# Patient Record
Sex: MALE | NOT HISPANIC OR LATINO | Employment: FULL TIME | URBAN - NONMETROPOLITAN AREA
[De-identification: names, ages, dates, MRNs, and addresses within clinical notes are randomized per-mention and may not be internally consistent; named-entity substitution may affect disease eponyms.]

---

## 2022-05-06 ENCOUNTER — PREP FOR SURGERY (OUTPATIENT)
Dept: OTHER | Facility: HOSPITAL | Age: 52
End: 2022-05-06

## 2022-05-06 DIAGNOSIS — Z11.52 ENCOUNTER FOR SCREENING FOR COVID-19: Primary | ICD-10-CM

## 2022-05-06 RX ORDER — DEXTROSE AND SODIUM CHLORIDE 5; .45 G/100ML; G/100ML
30 INJECTION, SOLUTION INTRAVENOUS CONTINUOUS PRN
Status: CANCELLED | OUTPATIENT
Start: 2022-06-20

## 2022-06-20 ENCOUNTER — ANESTHESIA EVENT (OUTPATIENT)
Dept: GASTROENTEROLOGY | Facility: HOSPITAL | Age: 52
End: 2022-06-20

## 2022-06-20 ENCOUNTER — HOSPITAL ENCOUNTER (OUTPATIENT)
Facility: HOSPITAL | Age: 52
Setting detail: HOSPITAL OUTPATIENT SURGERY
Discharge: HOME | End: 2022-06-20
Attending: INTERNAL MEDICINE | Admitting: INTERNAL MEDICINE

## 2022-06-20 ENCOUNTER — ANESTHESIA (OUTPATIENT)
Dept: GASTROENTEROLOGY | Facility: HOSPITAL | Age: 52
End: 2022-06-20

## 2022-06-20 VITALS
BODY MASS INDEX: 30.88 KG/M2 | HEART RATE: 74 BPM | DIASTOLIC BLOOD PRESSURE: 74 MMHG | WEIGHT: 228 LBS | TEMPERATURE: 96.2 F | HEIGHT: 72 IN | SYSTOLIC BLOOD PRESSURE: 124 MMHG | OXYGEN SATURATION: 97 % | RESPIRATION RATE: 16 BRPM

## 2022-06-20 DIAGNOSIS — Z11.52 ENCOUNTER FOR SCREENING FOR COVID-19: ICD-10-CM

## 2022-06-20 PROCEDURE — 25010000002 PROPOFOL 10 MG/ML EMULSION

## 2022-06-20 RX ORDER — PROPOFOL 10 MG/ML
VIAL (ML) INTRAVENOUS AS NEEDED
Status: DISCONTINUED | OUTPATIENT
Start: 2022-06-20 | End: 2022-06-20 | Stop reason: SURG

## 2022-06-20 RX ORDER — LIDOCAINE HYDROCHLORIDE 20 MG/ML
INJECTION, SOLUTION INTRAVENOUS AS NEEDED
Status: DISCONTINUED | OUTPATIENT
Start: 2022-06-20 | End: 2022-06-20 | Stop reason: SURG

## 2022-06-20 RX ORDER — DEXTROSE AND SODIUM CHLORIDE 5; .45 G/100ML; G/100ML
30 INJECTION, SOLUTION INTRAVENOUS CONTINUOUS PRN
Status: DISCONTINUED | OUTPATIENT
Start: 2022-06-20 | End: 2022-06-20 | Stop reason: HOSPADM

## 2022-06-20 RX ORDER — OMEPRAZOLE 20 MG/1
20 CAPSULE, DELAYED RELEASE ORAL DAILY
COMMUNITY

## 2022-06-20 RX ORDER — MULTIVIT WITH MINERALS/LUTEIN
250 TABLET ORAL DAILY
COMMUNITY

## 2022-06-20 RX ORDER — CETIRIZINE HYDROCHLORIDE 10 MG/1
10 TABLET ORAL DAILY
COMMUNITY

## 2022-06-20 RX ADMIN — PROPOFOL 50 MG: 10 INJECTION, EMULSION INTRAVENOUS at 15:24

## 2022-06-20 RX ADMIN — PROPOFOL 100 MG: 10 INJECTION, EMULSION INTRAVENOUS at 15:19

## 2022-06-20 RX ADMIN — DEXTROSE AND SODIUM CHLORIDE 30 ML/HR: 5; 450 INJECTION, SOLUTION INTRAVENOUS at 14:12

## 2022-06-20 RX ADMIN — PROPOFOL 50 MG: 10 INJECTION, EMULSION INTRAVENOUS at 15:21

## 2022-06-20 RX ADMIN — LIDOCAINE HYDROCHLORIDE 40 MG: 20 INJECTION, SOLUTION INTRAVENOUS at 15:19

## 2022-06-20 RX ADMIN — PROPOFOL 50 MG: 10 INJECTION, EMULSION INTRAVENOUS at 15:28

## 2022-06-20 NOTE — ANESTHESIA POSTPROCEDURE EVALUATION
Patient: Jung Selby    Procedure Summary     Date: 06/20/22 Room / Location: [facility] ENDOSCOPY 2 / [facility] ENDOSCOPY    Anesthesia Start: 1518 Anesthesia Stop: 1534    Procedure: COLONOSCOPY 3:30 (N/A ) Diagnosis:       Encounter for screening for COVID-19      (Encounter for screening for COVID-19 [Z11.52])    Surgeons: Nestor Echols DO Provider: Marta William CRNA    Anesthesia Type: MAC ASA Status: 2          Anesthesia Type: MAC    Vitals  No vitals data found for the desired time range.          Post Anesthesia Care and Evaluation    Patient location during evaluation: bedside  Patient participation: complete - patient participated  Level of consciousness: awake and alert  Pain management: adequate    Airway patency: patent  Anesthetic complications: No anesthetic complications  PONV Status: none  Cardiovascular status: acceptable  Respiratory status: acceptable  Hydration status: acceptable    Comments: -------------------------              06/20/22                    1405        -------------------------   BP:         150/90        Pulse:        84          Resp:         18          Temp:   98.6 °F (37 °C)   SpO2:         98%        -------------------------

## 2022-06-20 NOTE — ANESTHESIA PREPROCEDURE EVALUATION
Anesthesia Evaluation     Patient summary reviewed and Nursing notes reviewed   NPO Solid Status: > 8 hours  NPO Liquid Status: > 4 hours           Airway   Mallampati: II  TM distance: >3 FB  Neck ROM: full  No difficulty expected  Dental      Pulmonary - negative pulmonary ROS and normal exam    breath sounds clear to auscultation  Cardiovascular - negative cardio ROS and normal exam    Rhythm: regular  Rate: normal        Neuro/Psych- negative ROS  GI/Hepatic/Renal/Endo - negative ROS     Musculoskeletal (-) negative ROS    Abdominal  - normal exam   Substance History      OB/GYN negative ob/gyn ROS         Other                        Anesthesia Plan    ASA 2     MAC     intravenous induction     Anesthetic plan, risks, benefits, and alternatives have been provided, discussed and informed consent has been obtained with: patient.        CODE STATUS:

## 2022-06-20 NOTE — H&P
Francoise Jackman DO,[facility]  Gastroenterology  Hepatology  Endoscopy  Board Certified in Internal Medicine and gastroenterology  [address], [address]  [place]. [address]  - [phone]   F - [phone]     GASTROENTEROLOGY HISTORY AND PHYSICAL  NOTE   FRANCOISE JACKMAN DO.         SUBJECTIVE:   6/20/2022    Name: Jung Selby  DOD: 1970        Chief Complaint:     Subjective : Screening for colon cancer    Patient is 51 y.o. male presents with desire for elective colonoscopy     ROS/HISTORY/ CURRENT MEDICATIONS/OBJECTIVE/VS/PE:   Review of Systems:  All systems unremarkable unless specified below.  Constitutional   HENT  Eyes   Respiratory    Cardiovascular  Gastrointestinal   Endocrine  Genitourinary    Musculoskeletal   Skin  Allergic/Immunologic    Neurological    Hematological  Psychiatric/Behavioral    History:   History reviewed. No pertinent past medical history.  History reviewed. No pertinent surgical history.  History reviewed. No pertinent family history.     Prior to Admission medications    Medication Sig Start Date End Date Taking? Authorizing Provider   cetirizine (zyrTEC) 10 MG tablet Take 10 mg by mouth Daily.   Yes ProviderPawel MD   omeprazole (priLOSEC) 20 MG capsule Take 20 mg by mouth Daily.   Yes ProviderPawel MD   vitamin C (ASCORBIC ACID) 250 MG tablet Take 250 mg by mouth Daily.   Yes Provider, MD Pawel     Allergies:  Patient has no known allergies.    I have reviewed the patients medical history, surgical history and family history in the available medical record system.     Current Medications:     Current Facility-Administered Medications   Medication Dose Route Frequency Provider Last Rate Last Admin   • dextrose 5 % and sodium chloride 0.45 % infusion  30 mL/hr Intravenous Continuous PRN Francoise Jackman DO 30 mL/hr at 06/20/22 1412 30 mL/hr at 06/20/22 1412       Objective     Physical Exam:   Temp:  [98.6 °F (37 °C)] 98.6 °F  (37 °C)  Heart Rate:  [84] 84  Resp:  [18] 18  BP: (150)/(90) 150/90    Physical Exam:  General Appearance:    Alert, cooperative, in no acute distress   Head:    Normocephalic, without obvious abnormality, atraumatic   Eyes:            Lids and lashes normal, conjunctivae and sclerae normal, no icterus, no pallor, corneas clear, PERRLA   Ears:    Ears appear intact with no abnormalities noted   Throat:   No oral lesions, no thrush, oral mucosa moist   Neck:   No adenopathy, supple, trachea midline, no thyromegaly, no  carotid bruit, no JVD   Back:     No kyphosis present, no scoliosis present, no skin lesions,   erythema or scars, no tenderness to percussion or                 palpation,  range of motion normal   Lungs:     Clear to auscultation,respirations regular, even and         unlabored    Heart:    Regular rhythm and normal rate, normal S1 and S2, no  murmur, no gallop, no rub, no click   Breast Exam:    Deferred   Abdomen:     Normal bowel sounds, no masses, no organomegaly, soft  nontender, nondistended, no guarding, no rebound                 tenderness   Genitalia:    Deferred   Extremities:   Moves all extremities well, no edema, no cyanosis, no          redness   Pulses:   Pulses palpable and equal bilaterally   Skin:   No bleeding, bruising or rash   Lymph nodes:   No palpable adenopathy   Neurologic:   Cranial nerves 2 - 12 grossly intact, sensation intact, DTR     present and equal bilaterally      Results Review:     Lab Results   Component Value Date    WBC 7.7 01/30/2019    HGB 13.9 (L) 01/30/2019    HCT 42.1 (L) 01/30/2019     01/30/2019             No results found for: LIPASE  No results found for: INR  No results found for: CULTURE    Radiology Review:  Imaging Results (Last 72 Hours)     ** No results found for the last 72 hours. **           I reviewed the patient's new clinical results.  I reviewed the patient's new imaging results and agree with the interpretation.      ASSESSMENT/PLAN:   ASSESSMENT:  1.  Screen for colon cancer    PLAN:  1.  Colonoscopy    Risk and benefits associated with the procedure are reviewed with the patient.  The patient wished to proceed     Nestor Echols DO  06/20/22  15:16 CDT

## 2022-07-27 ENCOUNTER — OFFICE VISIT (OUTPATIENT)
Dept: OTOLARYNGOLOGY | Facility: CLINIC | Age: 52
End: 2022-07-27

## 2022-07-27 ENCOUNTER — CLINICAL SUPPORT (OUTPATIENT)
Dept: AUDIOLOGY | Facility: CLINIC | Age: 52
End: 2022-07-27

## 2022-07-27 VITALS — HEIGHT: 72 IN | WEIGHT: 237.4 LBS | BODY MASS INDEX: 32.15 KG/M2 | TEMPERATURE: 98.1 F

## 2022-07-27 DIAGNOSIS — H90.12 CONDUCTIVE HEARING LOSS OF LEFT EAR WITH UNRESTRICTED HEARING OF RIGHT EAR: Primary | ICD-10-CM

## 2022-07-27 DIAGNOSIS — H69.82 DYSFUNCTION OF LEFT EUSTACHIAN TUBE: ICD-10-CM

## 2022-07-27 PROCEDURE — 92557 COMPREHENSIVE HEARING TEST: CPT | Performed by: AUDIOLOGIST

## 2022-07-27 PROCEDURE — 92567 TYMPANOMETRY: CPT | Performed by: AUDIOLOGIST

## 2022-07-27 PROCEDURE — 99203 OFFICE O/P NEW LOW 30 MIN: CPT | Performed by: OTOLARYNGOLOGY

## 2022-07-27 NOTE — PROGRESS NOTES
STANDARD AUDIOMETRIC EVALUATION      Name:  Jung Selby  :  1970  Age:  51 y.o.  Date of Evaluation:  2022      HISTORY    Reason for visit:  Jung Selby is seen today for a hearing test at the request of Dr. Junior Loomis.  Patient reports a history of surgery on his left ear in  and  which he describes as a tympanoplasty.  He states he has scar tissue in his left ear, and he can't hear in his left ear.  He states he does not wear a hearing aid, and he has not had any recent ear infections.      EVALUATION    See Audiogram    RESULTS        Otoscopy and Tympanometry 226 Hz :  Right Ear:  Otoscopy:  Testing completed after ears were examined by the ENT physician          Tympanometry:  Middle ear function within normal limits    Left Ear:   Otoscopy:  Testing completed after ears were examined by the ENT physician        Tympanometry:  Reduced pressure and compliance with large ear canal volume    Test technique:  Standard Audiometry     Pure Tone Audiometry:   Patient responded to pure tones at 5-5 dB for 250-8000 Hz in right ear, and at 10-40 dB for 250-8000 Hz in left ear.       Speech Audiometry:        Right Ear:  Speech Reception Threshold (SRT) was obtained at 0 dBHL                 Speech Discrimination scores were 100% in quiet when words were presented at 40 dBHL       Left Ear:  Speech Reception Threshold (SRT) was obtained at 25 dBHL                 Speech Discrimination scores were 100% in masking noise when words were presented at  65 dBHL    Reliability:   good    IMPRESSIONS:  1.  Tympanometry results are consistent with Middle ear function within normal limits in right ear, and Reduced pressure and compliance with large ear canal volume in left ear.  2.  Pure tone results are consistent with hearing sensitivity within normal limits for right ear, and within normal limits to mild rising, then sloping conductive hearing loss in left ear.        RECOMMENDATIONS:  Patient is seeing the Ear Nose and Throat physician immediately following this examination.  It was a pleasure seeing Jung Selby in Audiology today.  We would be happy to do further testing or discuss these test as necessary.          This document has been electronically signed by Beth Lipscomb MS CCC-ANGELITA on July 27, 2022 16:53 CDT       Beth Lipscomb MS CCC-ANGELITA  Licensed Audiologist

## 2022-07-27 NOTE — PROGRESS NOTES
Subjective   Jung Selby is a 51 y.o. male.       History of Present Illness   Patient is here to establish care.  States he underwent tympanoplasty on the left ear x2 most recently in 1990.  Sounds like he may have had an ossiculoplasty with the second procedure.  This was done elsewhere.  Says that he used to see an otolaryngologist regularly to have his ear cleaned.  Hearing is decreased on the left.  He is not having any otorrhea.      The following portions of the patient's history were reviewed and updated as appropriate: allergies, current medications, past family history, past medical history, past social history, past surgical history and problem list.     reports that he has never smoked. He has never used smokeless tobacco.   Patient is not a tobacco user and has not been counseled for use of tobacco products      Review of Systems        Objective   Physical Exam  Ears: External ears no deformity.  Right ear canal shows no discharge.  Tympanic membrane intact and clear.  Left ear shows per surgical appearance.  He appears to have had a canalplasty but not a canal wall down mastoid cavity and does not have any significant squamous debris.  Tympanic membrane is intact with dense tympanosclerosis.  Audiogram is obtained and reviewed.  Hearing is entirely within normal limits on the right with a type a tympanogram and 100% discrimination.  Has a mild conductive hearing loss on the left with 100% discrimination and a flat tympanogram.       Assessment and Plan   Diagnoses and all orders for this visit:    1. Conductive hearing loss of left ear with unrestricted hearing of right ear (Primary)             Plan: Reassured the patient that his ears did not need cleaning and that his hearing loss was mild.  Would just recommend observation and reevaluation in a year to see if he develops any debris accumulation.  We will only retest his hearing if he notes a subjective change.  Call for problems in the  meantime.

## 2022-11-29 ENCOUNTER — TRANSCRIBE ORDERS (OUTPATIENT)
Dept: PODIATRY | Facility: CLINIC | Age: 52
End: 2022-11-29

## 2022-11-29 DIAGNOSIS — S91.209A AVULSION OF TOENAIL OF RIGHT FOOT: Primary | ICD-10-CM

## 2023-01-06 ENCOUNTER — HOSPITAL ENCOUNTER (EMERGENCY)
Facility: HOSPITAL | Age: 53
Discharge: HOME | End: 2023-01-06
Attending: STUDENT IN AN ORGANIZED HEALTH CARE EDUCATION/TRAINING PROGRAM | Admitting: STUDENT IN AN ORGANIZED HEALTH CARE EDUCATION/TRAINING PROGRAM
Payer: COMMERCIAL

## 2023-01-06 ENCOUNTER — APPOINTMENT (OUTPATIENT)
Dept: GENERAL RADIOLOGY | Facility: HOSPITAL | Age: 53
End: 2023-01-06
Payer: COMMERCIAL

## 2023-01-06 VITALS
WEIGHT: 230.4 LBS | OXYGEN SATURATION: 95 % | BODY MASS INDEX: 31.21 KG/M2 | DIASTOLIC BLOOD PRESSURE: 85 MMHG | SYSTOLIC BLOOD PRESSURE: 131 MMHG | TEMPERATURE: 98.5 F | RESPIRATION RATE: 18 BRPM | HEIGHT: 72 IN | HEART RATE: 71 BPM

## 2023-01-06 DIAGNOSIS — R07.9 CHEST PAIN, UNSPECIFIED TYPE: Primary | ICD-10-CM

## 2023-01-06 LAB
ALBUMIN SERPL-MCNC: 4.9 G/DL (ref 3.5–5.2)
ALBUMIN/GLOB SERPL: 1.8 G/DL
ALP SERPL-CCNC: 58 U/L (ref 39–117)
ALT SERPL W P-5'-P-CCNC: 31 U/L (ref 1–41)
ANION GAP SERPL CALCULATED.3IONS-SCNC: 12 MMOL/L (ref 5–15)
AST SERPL-CCNC: 18 U/L (ref 1–40)
BASOPHILS # BLD AUTO: 0.03 10*3/MM3 (ref 0–0.2)
BASOPHILS NFR BLD AUTO: 0.4 % (ref 0–1.5)
BILIRUB SERPL-MCNC: 0.6 MG/DL (ref 0–1.2)
BUN SERPL-MCNC: 13 MG/DL (ref 6–20)
BUN/CREAT SERPL: 13.4 (ref 7–25)
CALCIUM SPEC-SCNC: 9.6 MG/DL (ref 8.6–10.5)
CHLORIDE SERPL-SCNC: 100 MMOL/L (ref 98–107)
CO2 SERPL-SCNC: 27 MMOL/L (ref 22–29)
CREAT SERPL-MCNC: 0.97 MG/DL (ref 0.76–1.27)
D-DIMER, QUANTITATIVE (MAD,POW, STR): 340 NG/ML (FEU) (ref 0–510)
DEPRECATED RDW RBC AUTO: 37.2 FL (ref 37–54)
EGFRCR SERPLBLD CKD-EPI 2021: 94.5 ML/MIN/1.73
EOSINOPHIL # BLD AUTO: 0.03 10*3/MM3 (ref 0–0.4)
EOSINOPHIL NFR BLD AUTO: 0.4 % (ref 0.3–6.2)
ERYTHROCYTE [DISTWIDTH] IN BLOOD BY AUTOMATED COUNT: 11.9 % (ref 12.3–15.4)
GLOBULIN UR ELPH-MCNC: 2.8 GM/DL
GLUCOSE SERPL-MCNC: 117 MG/DL (ref 65–99)
HCT VFR BLD AUTO: 43.5 % (ref 37.5–51)
HGB BLD-MCNC: 14.4 G/DL (ref 13–17.7)
HOLD SPECIMEN: NORMAL
HOLD SPECIMEN: NORMAL
IMM GRANULOCYTES # BLD AUTO: 0.02 10*3/MM3 (ref 0–0.05)
IMM GRANULOCYTES NFR BLD AUTO: 0.3 % (ref 0–0.5)
LYMPHOCYTES # BLD AUTO: 1.33 10*3/MM3 (ref 0.7–3.1)
LYMPHOCYTES NFR BLD AUTO: 17.9 % (ref 19.6–45.3)
MCH RBC QN AUTO: 28.5 PG (ref 26.6–33)
MCHC RBC AUTO-ENTMCNC: 33.1 G/DL (ref 31.5–35.7)
MCV RBC AUTO: 86 FL (ref 79–97)
MONOCYTES # BLD AUTO: 0.47 10*3/MM3 (ref 0.1–0.9)
MONOCYTES NFR BLD AUTO: 6.3 % (ref 5–12)
NEUTROPHILS NFR BLD AUTO: 5.57 10*3/MM3 (ref 1.7–7)
NEUTROPHILS NFR BLD AUTO: 74.7 % (ref 42.7–76)
NRBC BLD AUTO-RTO: 0 /100 WBC (ref 0–0.2)
NT-PROBNP SERPL-MCNC: <36 PG/ML (ref 0–900)
PLATELET # BLD AUTO: 188 10*3/MM3 (ref 140–450)
PMV BLD AUTO: 9.7 FL (ref 6–12)
POTASSIUM SERPL-SCNC: 3.9 MMOL/L (ref 3.5–5.2)
PROT SERPL-MCNC: 7.7 G/DL (ref 6–8.5)
RBC # BLD AUTO: 5.06 10*6/MM3 (ref 4.14–5.8)
SODIUM SERPL-SCNC: 139 MMOL/L (ref 136–145)
TROPONIN T SERPL-MCNC: <0.01 NG/ML (ref 0–0.03)
TROPONIN T SERPL-MCNC: <0.01 NG/ML (ref 0–0.03)
WBC NRBC COR # BLD: 7.45 10*3/MM3 (ref 3.4–10.8)
WHOLE BLOOD HOLD COAG: NORMAL
WHOLE BLOOD HOLD SPECIMEN: NORMAL

## 2023-01-06 PROCEDURE — 93005 ELECTROCARDIOGRAM TRACING: CPT | Performed by: STUDENT IN AN ORGANIZED HEALTH CARE EDUCATION/TRAINING PROGRAM

## 2023-01-06 PROCEDURE — 83880 ASSAY OF NATRIURETIC PEPTIDE: CPT | Performed by: STUDENT IN AN ORGANIZED HEALTH CARE EDUCATION/TRAINING PROGRAM

## 2023-01-06 PROCEDURE — 93005 ELECTROCARDIOGRAM TRACING: CPT

## 2023-01-06 PROCEDURE — 85379 FIBRIN DEGRADATION QUANT: CPT | Performed by: STUDENT IN AN ORGANIZED HEALTH CARE EDUCATION/TRAINING PROGRAM

## 2023-01-06 PROCEDURE — 84484 ASSAY OF TROPONIN QUANT: CPT | Performed by: STUDENT IN AN ORGANIZED HEALTH CARE EDUCATION/TRAINING PROGRAM

## 2023-01-06 PROCEDURE — 80053 COMPREHEN METABOLIC PANEL: CPT | Performed by: STUDENT IN AN ORGANIZED HEALTH CARE EDUCATION/TRAINING PROGRAM

## 2023-01-06 PROCEDURE — 96375 TX/PRO/DX INJ NEW DRUG ADDON: CPT

## 2023-01-06 PROCEDURE — 25010000002 MORPHINE PER 10 MG: Performed by: STUDENT IN AN ORGANIZED HEALTH CARE EDUCATION/TRAINING PROGRAM

## 2023-01-06 PROCEDURE — 96374 THER/PROPH/DIAG INJ IV PUSH: CPT

## 2023-01-06 PROCEDURE — 25010000002 ONDANSETRON PER 1 MG: Performed by: STUDENT IN AN ORGANIZED HEALTH CARE EDUCATION/TRAINING PROGRAM

## 2023-01-06 PROCEDURE — 71045 X-RAY EXAM CHEST 1 VIEW: CPT

## 2023-01-06 PROCEDURE — 93010 ELECTROCARDIOGRAM REPORT: CPT | Performed by: INTERNAL MEDICINE

## 2023-01-06 PROCEDURE — 36415 COLL VENOUS BLD VENIPUNCTURE: CPT

## 2023-01-06 PROCEDURE — 99284 EMERGENCY DEPT VISIT MOD MDM: CPT

## 2023-01-06 PROCEDURE — 85025 COMPLETE CBC W/AUTO DIFF WBC: CPT | Performed by: STUDENT IN AN ORGANIZED HEALTH CARE EDUCATION/TRAINING PROGRAM

## 2023-01-06 RX ORDER — SODIUM CHLORIDE 0.9 % (FLUSH) 0.9 %
10 SYRINGE (ML) INJECTION AS NEEDED
Status: DISCONTINUED | OUTPATIENT
Start: 2023-01-06 | End: 2023-01-06 | Stop reason: HOSPADM

## 2023-01-06 RX ORDER — ONDANSETRON 2 MG/ML
4 INJECTION INTRAMUSCULAR; INTRAVENOUS ONCE
Status: COMPLETED | OUTPATIENT
Start: 2023-01-06 | End: 2023-01-06

## 2023-01-06 RX ORDER — NITROGLYCERIN 0.4 MG/1
0.4 TABLET SUBLINGUAL
Status: DISCONTINUED | OUTPATIENT
Start: 2023-01-06 | End: 2023-01-06 | Stop reason: HOSPADM

## 2023-01-06 RX ORDER — NABUMETONE 750 MG/1
750 TABLET, FILM COATED ORAL 2 TIMES DAILY PRN
Qty: 6 TABLET | Refills: 0 | Status: SHIPPED | OUTPATIENT
Start: 2023-01-06 | End: 2023-01-09 | Stop reason: SDUPTHER

## 2023-01-06 RX ORDER — ASPIRIN 81 MG/1
324 TABLET, CHEWABLE ORAL ONCE
Status: COMPLETED | OUTPATIENT
Start: 2023-01-06 | End: 2023-01-06

## 2023-01-06 RX ADMIN — ONDANSETRON 4 MG: 2 INJECTION INTRAMUSCULAR; INTRAVENOUS at 12:26

## 2023-01-06 RX ADMIN — MORPHINE SULFATE 4 MG: 4 INJECTION, SOLUTION INTRAMUSCULAR; INTRAVENOUS at 12:26

## 2023-01-06 RX ADMIN — ASPIRIN 324 MG: 81 TABLET, CHEWABLE ORAL at 10:40

## 2023-01-06 NOTE — ED PROVIDER NOTES
Subjective   History of Present Illness  52-year-old male with a history of GERD comes to the ER with chief complaint of squeezing in his left chest with numbness and tingling down his left arm.  He woke up with the symptoms.  He has had similar episodes in the past, but today its worse than normal.  He reports having a stress test done about 2 years ago and was told that was normal as well.  Nothing seems to make his symptoms better or worse.    History provided by:  Patient and spouse   used: No        Review of Systems   Constitutional: Negative for chills and fever.   HENT: Negative for drooling.    Eyes: Negative for redness.   Respiratory: Negative for cough, chest tightness, shortness of breath and wheezing.    Cardiovascular: Positive for chest pain. Negative for palpitations.   Gastrointestinal: Negative for abdominal pain, constipation, diarrhea, nausea and vomiting.   Genitourinary: Negative for flank pain.   Skin: Negative for color change.   Neurological: Negative for dizziness, seizures, facial asymmetry, speech difficulty, weakness, light-headedness, numbness and headaches.   Psychiatric/Behavioral: Negative for confusion.       History reviewed. No pertinent past medical history.    Allergies   Allergen Reactions   • Penicillins Rash       Past Surgical History:   Procedure Laterality Date   • COLONOSCOPY N/A 06/20/2022    Procedure: COLONOSCOPY 3:30;  Surgeon: Nestor Echols DO;  Location: [facility] ENDOSCOPY;  Service: Gastroenterology;  Laterality: N/A;   • CORNEAL TRANSPLANT Right    • TYMPANOPLASTY Left        History reviewed. No pertinent family history.    Social History     Socioeconomic History   • Marital status:    Tobacco Use   • Smoking status: Never   • Smokeless tobacco: Never           Objective    Vitals:    01/06/23 0920 01/06/23 1039 01/06/23 1151   BP: 149/88 126/82 131/85   BP Location: Right arm     Patient Position: Sitting     Pulse: 84 74 71  "  Resp: 20 19 18   Temp: 98.5 °F (36.9 °C)     TempSrc: Oral     SpO2: 97% 95% 95%   Weight: 105 kg (230 lb 6.4 oz)     Height: 182.9 cm (72\")         Physical Exam  Vitals and nursing note reviewed.   Constitutional:       General: He is not in acute distress.     Appearance: He is well-developed. He is not ill-appearing, toxic-appearing or diaphoretic.   HENT:      Nose: No congestion or rhinorrhea.   Eyes:      General: No scleral icterus.     Conjunctiva/sclera: Conjunctivae normal.   Cardiovascular:      Rate and Rhythm: Normal rate.   Pulmonary:      Effort: Pulmonary effort is normal. No accessory muscle usage or respiratory distress.   Chest:      Chest wall: No tenderness.   Abdominal:      Palpations: Abdomen is soft.      Tenderness: There is no abdominal tenderness (deep palpation).   Musculoskeletal:         General: No tenderness or signs of injury. Normal range of motion.   Skin:     General: Skin is warm and dry.      Capillary Refill: Capillary refill takes less than 2 seconds.   Neurological:      Mental Status: He is alert and oriented to person, place, and time.      GCS: GCS eye subscore is 4. GCS verbal subscore is 5. GCS motor subscore is 6.      Cranial Nerves: No dysarthria or facial asymmetry.      Sensory: No sensory deficit.      Motor: No weakness or abnormal muscle tone.         ECG 12 Lead      Date/Time: 1/6/2023 12:21 PM  Performed by: Devin Merida MD  Authorized by: Devin Merida MD   Interpreted by physician  Rhythm: sinus rhythm  Rate: normal  QRS axis: normal  ST Segments: ST segments normal  T Waves: T waves normal  Clinical impression: normal ECG                 ED Course      Results for orders placed or performed during the hospital encounter of 01/06/23   Troponin    Specimen: Blood   Result Value Ref Range    Troponin T <0.010 0.000 - 0.030 ng/mL   Troponin    Specimen: Blood   Result Value Ref Range    Troponin T <0.010 0.000 - 0.030 ng/mL   Comprehensive " Metabolic Panel    Specimen: Blood   Result Value Ref Range    Glucose 117 (H) 65 - 99 mg/dL    BUN 13 6 - 20 mg/dL    Creatinine 0.97 0.76 - 1.27 mg/dL    Sodium 139 136 - 145 mmol/L    Potassium 3.9 3.5 - 5.2 mmol/L    Chloride 100 98 - 107 mmol/L    CO2 27.0 22.0 - 29.0 mmol/L    Calcium 9.6 8.6 - 10.5 mg/dL    Total Protein 7.7 6.0 - 8.5 g/dL    Albumin 4.9 3.5 - 5.2 g/dL    ALT (SGPT) 31 1 - 41 U/L    AST (SGOT) 18 1 - 40 U/L    Alkaline Phosphatase 58 39 - 117 U/L    Total Bilirubin 0.6 0.0 - 1.2 mg/dL    Globulin 2.8 gm/dL    A/G Ratio 1.8 g/dL    BUN/Creatinine Ratio 13.4 7.0 - 25.0    Anion Gap 12.0 5.0 - 15.0 mmol/L    eGFR 94.5 >60.0 mL/min/1.73   BNP    Specimen: Blood   Result Value Ref Range    proBNP <36.0 0.0 - 900.0 pg/mL   CBC Auto Differential    Specimen: Blood   Result Value Ref Range    WBC 7.45 3.40 - 10.80 10*3/mm3    RBC 5.06 4.14 - 5.80 10*6/mm3    Hemoglobin 14.4 13.0 - 17.7 g/dL    Hematocrit 43.5 37.5 - 51.0 %    MCV 86.0 79.0 - 97.0 fL    MCH 28.5 26.6 - 33.0 pg    MCHC 33.1 31.5 - 35.7 g/dL    RDW 11.9 (L) 12.3 - 15.4 %    RDW-SD 37.2 37.0 - 54.0 fl    MPV 9.7 6.0 - 12.0 fL    Platelets 188 140 - 450 10*3/mm3    Neutrophil % 74.7 42.7 - 76.0 %    Lymphocyte % 17.9 (L) 19.6 - 45.3 %    Monocyte % 6.3 5.0 - 12.0 %    Eosinophil % 0.4 0.3 - 6.2 %    Basophil % 0.4 0.0 - 1.5 %    Immature Grans % 0.3 0.0 - 0.5 %    Neutrophils, Absolute 5.57 1.70 - 7.00 10*3/mm3    Lymphocytes, Absolute 1.33 0.70 - 3.10 10*3/mm3    Monocytes, Absolute 0.47 0.10 - 0.90 10*3/mm3    Eosinophils, Absolute 0.03 0.00 - 0.40 10*3/mm3    Basophils, Absolute 0.03 0.00 - 0.20 10*3/mm3    Immature Grans, Absolute 0.02 0.00 - 0.05 10*3/mm3    nRBC 0.0 0.0 - 0.2 /100 WBC   D-dimer, Quantitative    Specimen: Blood   Result Value Ref Range    D-Dimer, Quantitative 340 0 - 510 ng/mL (FEU)   ECG 12 Lead Chest Pain   Result Value Ref Range    QT Interval 368 ms    QTC Interval 427 ms   Green Top (Gel)   Result Value Ref  Range    Extra Tube Hold for add-ons.    Lavender Top   Result Value Ref Range    Extra Tube hold for add-on    Gold Top - SST   Result Value Ref Range    Extra Tube Hold for add-ons.    Light Blue Top   Result Value Ref Range    Extra Tube Hold for add-ons.      XR Chest 1 View   Final Result   Negative single view chest      Electronically signed by:  Arjun Gonzales MD  1/6/2023 11:06 AM CST   Workstation: [id number]          HEART Score for Major Cardiac Events - MDCalc  3 points -> Low Score (0-3 points) Risk of MACE of 0.9-1.7%.       Medical Decision Making  Vital signs are stable, afebrile.  Labs unremarkable.  Troponin negative x2.  D-dimer negative.  Chest x-ray shows no acute cardiopulmonary processes.  EKG is sinus rhythm no acute ischemic changes.  Recommend patient follow-up with his PCP and cardiology.  He has an appointment to see GI next week.  Return precautions given.  Patient states understanding and is agreeable to the plan.    Chest pain, unspecified type: acute illness or injury  Amount and/or Complexity of Data Reviewed  Labs: ordered. Decision-making details documented in ED Course.  Radiology: ordered. Decision-making details documented in ED Course.  ECG/medicine tests: ordered and independent interpretation performed. Decision-making details documented in ED Course.      Risk  OTC drugs.  Prescription drug management.          Final diagnoses:   Chest pain, unspecified type       ED Disposition  ED Disposition     ED Disposition   Discharge    Condition   Stable    Comment   --             Giselle Richards, APRN  [address] [address]  [phone]    Schedule an appointment as soon as possible for a visit in 2 days  ER follow up    Laura Wolf, APRN  [address]  [address] [place] [address]  [phone]    Schedule an appointment as soon as possible for a visit in 2 days  ER follow up         Medication List      New Prescriptions    nabumetone 750  MG tablet  Commonly known as: RELAFEN  Take 1 tablet by mouth 2 (Two) Times a Day As Needed for Mild Pain.           Where to Get Your Medications      These medications were sent to [facility] - [place], [place] - [address] - [phone]  - [phone] [facility] [address], [place] [address]    Phone: [phone]   · nabumetone 750 MG tablet          Devin Merida MD  01/06/23 7215

## 2023-01-08 LAB
QT INTERVAL: 368 MS
QTC INTERVAL: 427 MS

## 2023-01-09 ENCOUNTER — OFFICE VISIT (OUTPATIENT)
Dept: CARDIOLOGY | Facility: CLINIC | Age: 53
End: 2023-01-09
Payer: COMMERCIAL

## 2023-01-09 VITALS
DIASTOLIC BLOOD PRESSURE: 80 MMHG | SYSTOLIC BLOOD PRESSURE: 124 MMHG | OXYGEN SATURATION: 97 % | WEIGHT: 231 LBS | HEART RATE: 75 BPM | BODY MASS INDEX: 31.29 KG/M2 | HEIGHT: 72 IN

## 2023-01-09 DIAGNOSIS — K21.9 GASTROESOPHAGEAL REFLUX DISEASE, UNSPECIFIED WHETHER ESOPHAGITIS PRESENT: ICD-10-CM

## 2023-01-09 DIAGNOSIS — R07.89 CHEST PAIN, ATYPICAL: Primary | ICD-10-CM

## 2023-01-09 PROCEDURE — 99204 OFFICE O/P NEW MOD 45 MIN: CPT | Performed by: NURSE PRACTITIONER

## 2023-01-09 RX ORDER — NABUMETONE 750 MG/1
750 TABLET, FILM COATED ORAL 2 TIMES DAILY PRN
Qty: 14 TABLET | Refills: 0 | Status: SHIPPED | OUTPATIENT
Start: 2023-01-09

## 2023-01-09 NOTE — PROGRESS NOTES
Chest Pain (Chief Complaint- hospital follow up)      History of Present Illness    Mr. Jung Selby \"Trevon\" is a 82-year-old  male with medical history GERD, allergic rhinitis who presents today for evaluation of chest pain.  She reports symptoms started in June when he thought he was having a heart attack.  He sought treatment and work-up was negative.  They started him on Prilosec and he had another episode in the fall work-up was negative again including troponins, EKG, x-rays.  They increased Prilosec at that time.  He reports having an ultrasound of his gallbladder was normal.  Recently he was seen in the ER on 1-6-2023.  Patient describes left-sided chest pain, described as squeezing \"feels like a cramp\", radiating down left arm with numbness and tingling.  Patient reports he woke up with the symptoms. Symptoms have reoccurred several times. Worse when he wakes up in the morning. Will last 4 hrs, longest 8 hrs. Additional symptoms of diaphoresis. Denies recent injury or illness.     -ER work-up reviewed showing normal EKG, negative cardiac enzymes chest x-ray clear. He was given Nabumetone 750mg, which he reports has helped some. Denies symptoms being worsened after meals, movement or exertion.  He was evaluated by cardiology in 2019 by Dr. Parrish at [facility] in [facility].  Echocardiogram stress test was low risk, no ischemia noted. Holter monitor was normal.      The 10-year ASCVD risk score (Omid ORTEGA, et al., 2019) is: 4.3%    Values used to calculate the score:      Age: 52 years      Sex: Male      Is Non- : No      Diabetic: No      Tobacco smoker: No      Systolic Blood Pressure: 124 mmHg      Is BP treated: No      HDL Cholesterol: 45 mg/dL      Total Cholesterol: 208 mg/dL    Past Medical History:   Diagnosis Date   • GERD (gastroesophageal reflux disease)    • Hx of eye surgery    • Perforated ear drum      Past Surgical History:   Procedure Laterality  Date   • COLONOSCOPY N/A 06/20/2022    Procedure: COLONOSCOPY 3:30;  Surgeon: Nestor Echols DO;  Location: [facility] ENDOSCOPY;  Service: Gastroenterology;  Laterality: N/A;   • CORNEAL TRANSPLANT Right    • TYMPANOPLASTY Left      Social History     Socioeconomic History   • Marital status:    Tobacco Use   • Smoking status: Never   • Smokeless tobacco: Never   Substance and Sexual Activity   • Alcohol use: Not Currently   • Drug use: Never     History reviewed. No pertinent family history.    ALLERGIES:  Allergies   Allergen Reactions   • Penicillins Rash         Review of Systems   Constitutional: Negative for chills, fever, malaise/fatigue and weight gain.   HENT: Negative for nosebleeds and tinnitus.    Eyes: Negative for blurred vision and double vision.   Cardiovascular: Positive for chest pain. Negative for dyspnea on exertion, irregular heartbeat, leg swelling, palpitations and syncope.   Respiratory: Negative for cough, shortness of breath, sleep disturbances due to breathing and snoring.    Endocrine: Negative for polydipsia, polyphagia and polyuria.   Hematologic/Lymphatic: Negative for bleeding problem. Does not bruise/bleed easily.   Skin: Negative for color change and suspicious lesions.   Musculoskeletal: Negative for falls and myalgias.   Gastrointestinal: Positive for heartburn. Negative for bloating and hematochezia.   Genitourinary: Negative for dysuria and hematuria.   Neurological: Negative for dizziness, headaches, seizures, vertigo and weakness.   Psychiatric/Behavioral: Negative for altered mental status and depression. The patient does not have insomnia and is not nervous/anxious.    Allergic/Immunologic: Negative for environmental allergies and persistent infections.       Current Outpatient Medications   Medication Sig Dispense Refill   • cetirizine (zyrTEC) 10 MG tablet Take 10 mg by mouth Daily.     • nabumetone (RELAFEN) 750 MG tablet Take 1 tablet by mouth 2 (Two) Times a Day  As Needed for Mild Pain. 14 tablet 0   • omeprazole (priLOSEC) 20 MG capsule Take 20 mg by mouth Daily.     • vitamin C (ASCORBIC ACID) 250 MG tablet Take 250 mg by mouth Daily.     • metoprolol tartrate (LOPRESSOR) 25 MG tablet Take 0.5 tablets by mouth Take As Directed. Night before and morning of CT scan 1 tablet 0     No current facility-administered medications for this visit.       OBJECTIVE:    Physical Exam:   Vitals reviewed.   Constitutional:       General: Not in acute distress.     Appearance: Normal appearance. Well-developed. Not toxic-appearing or diaphoretic.   Eyes:      General: Lids are normal.      Conjunctiva/sclera: Conjunctivae normal.   HENT:      Head: Normocephalic and atraumatic.      Right Ear: External ear normal.      Left Ear: External ear normal.   Neck:      Vascular: No JVD.   Pulmonary:      Effort: Pulmonary effort is normal. No respiratory distress.      Breath sounds: Normal breath sounds. No decreased breath sounds. No wheezing. No rales.   Chest:      Chest wall: Not tender to palpatation.   Cardiovascular:      PMI at left midclavicular line. Normal rate. Regular rhythm. Normal S1 with normal intensity. Normal S2 with normal intensity.      Murmurs: There is no murmur.      No gallop. No S3 and S4 gallop. No click. No rub.   Pulses:     Intact distal pulses. No decreased pulses.   Abdominal:      General: Bowel sounds are normal. There is no distension.      Palpations: Abdomen is soft.      Tenderness: There is no abdominal tenderness.   Musculoskeletal:      Cervical back: Normal range of motion and neck supple. Skin:     General: Skin is warm and dry.      Coloration: Skin is not pale.      Findings: No erythema or rash.   Neurological:      Mental Status: Alert and oriented to person, place, and time.      Gait: Gait normal.   Psychiatric:         Behavior: Behavior normal.         Thought Content: Thought content normal.         Judgment: Judgment normal.       Vitals:     01/09/23 1031   BP: 124/80   BP Location: Left arm   Patient Position: Sitting   Cuff Size: Adult   Pulse: 75   SpO2: 97%   Weight: 105 kg (231 lb)   Height: 182.9 cm (72\")       DATA REVIEWED:       XR Chest 1 View    Result Date: 1/6/2023  Negative single view chest Electronically signed by:  Arjun Gonzales MD  1/6/2023 11:06 AM CST Workstation: [id number]       Labs: BMP, CBC, LIPID, TSH  Lab Results   Component Value Date    GLUCOSE 117 (H) 01/06/2023    CALCIUM 9.6 01/06/2023     01/06/2023    K 3.9 01/06/2023    CO2 27.0 01/06/2023     01/06/2023    BUN 13 01/06/2023    CREATININE 0.97 01/06/2023    BCR 13.4 01/06/2023    ANIONGAP 12.0 01/06/2023     Lab Results   Component Value Date    WBC 7.45 01/06/2023    HGB 14.4 01/06/2023    HCT 43.5 01/06/2023    MCV 86.0 01/06/2023     01/06/2023     Lab Results   Component Value Date    CHOL 159 01/30/2019     Lab Results   Component Value Date    TRIG 220 (H) 04/20/2022    TRIG 180 01/30/2019     Lab Results   Component Value Date    HDL 45 04/20/2022    HDL 33 01/30/2019     No components found for: LDLCALC  Lab Results   Component Value Date     04/20/2022    LDL 96 01/30/2019     No results found for: HDLLDLRATIO  No components found for: CHOLHDL  Lab Results   Component Value Date    TSH 3.92 04/20/2022     Lab Results   Component Value Date    PROBNP <36.0 01/06/2023     EKG:     Stress Echocardiogram: 2019    RESTING ECHOCARDIOGRAM: The left ventricular size is normal. Ejection fraction is 60 percent. Wall motion appears normal. There were no valve abnormalities.     STRESS TEST RESULTS: The patient exercised for a total duration of 9:36. Achieved a maximum heart rate of 160 beats per minute which was 93% of target heart rate. The maximum blood pressure was 168/84.     SYMPTOMS: No chest discomfort lightheadness.     EKG FINDINGS: No ischemic ST-T changes.     POST-EXERCISE ECHOCARDIOGRAM: The post-exercise echocardiogram is a good  quality study. All segments are adequately visualized and demonstrate improved contractility. No segments of ischemia are identified.    SUPERVISING PHYSICIAN: Dr Zapien    IMPRESSION: Normal stress echocardiogram.       The following portions of the patient's history were reviewed and updated as appropriate: allergies, current medications, past family history, past medical history, past social history, past surgical history and problem list.  Old records reviewed and pertinent information is included in the above objective data.     ASSESSMENT/PLAN:       Diagnosis Plan   1. Chest pain, atypical  CT Angiogram Coronary    Adult Transthoracic Echo Complete w/ Color, Spectral and Contrast if Necessary Per Protocol    metoprolol tartrate (LOPRESSOR) 25 MG tablet    nabumetone (RELAFEN) 750 MG tablet      2. Gastroesophageal reflux disease, unspecified whether esophagitis present            #1. Chest pain syndrome. Pain characteristic: atypical angina   Patient is Low-Moderate Risk.     Ischemic evaluation:ordered.   EKG is Normal  Risks/Benefits discussed  Ischemia evaluation with Coronary CTA. CMP showing normal kidney function. BB with metoprolol ordered night before and AM of test. Given recurrent symptoms have recommended coronary CTA to assess calcium score, plaque/narrowing, myocardiac bridging.   TTE to assess for structural HD and RWMAs  Basic Labs, PA/LA CXR (results reviewed if completed)    #2. GERD: Chronic. Patients symptoms do not sounds like GERD. However, agree with GI referral and consideration for possible EGD.       Follow up: 1-2 months after testing.     I spent 45 minutes caring for Jung on this date of service. This time includes time spent by me in the following activities: preparing for the visit, reviewing tests, obtaining and/or reviewing a separately obtained history, performing a medically appropriate examination and/or evaluation, counseling and educating the patient/family/caregiver,  ordering medications, tests, or procedures and documenting information in the medical record- Reviewed records from Dr. Parrish's office.               This document has been electronically signed by VIRGINIE Mccoy on January 9, 2023 13:13 CST

## 2023-01-10 ENCOUNTER — PREP FOR SURGERY (OUTPATIENT)
Dept: OTHER | Facility: HOSPITAL | Age: 53
End: 2023-01-10
Payer: COMMERCIAL

## 2023-01-10 DIAGNOSIS — K21.9 GERD (GASTROESOPHAGEAL REFLUX DISEASE): Primary | ICD-10-CM

## 2023-01-10 DIAGNOSIS — R10.13 DYSPEPSIA: ICD-10-CM

## 2023-01-10 RX ORDER — DEXTROSE AND SODIUM CHLORIDE 5; .45 G/100ML; G/100ML
30 INJECTION, SOLUTION INTRAVENOUS CONTINUOUS PRN
Status: CANCELLED | OUTPATIENT
Start: 2023-01-12

## 2023-01-10 RX ORDER — SODIUM CHLORIDE 9 MG/ML
40 INJECTION, SOLUTION INTRAVENOUS AS NEEDED
Status: CANCELLED | OUTPATIENT
Start: 2023-01-12

## 2023-01-11 PROBLEM — R10.13 DYSPEPSIA: Status: ACTIVE | Noted: 2023-01-11

## 2023-01-12 ENCOUNTER — HOSPITAL ENCOUNTER (OUTPATIENT)
Facility: HOSPITAL | Age: 53
Setting detail: HOSPITAL OUTPATIENT SURGERY
Discharge: HOME | End: 2023-01-12
Attending: INTERNAL MEDICINE | Admitting: INTERNAL MEDICINE
Payer: COMMERCIAL

## 2023-01-12 ENCOUNTER — ANESTHESIA (OUTPATIENT)
Dept: GASTROENTEROLOGY | Facility: HOSPITAL | Age: 53
End: 2023-01-12
Payer: COMMERCIAL

## 2023-01-12 ENCOUNTER — ANESTHESIA EVENT (OUTPATIENT)
Dept: GASTROENTEROLOGY | Facility: HOSPITAL | Age: 53
End: 2023-01-12
Payer: COMMERCIAL

## 2023-01-12 VITALS
SYSTOLIC BLOOD PRESSURE: 99 MMHG | HEART RATE: 68 BPM | OXYGEN SATURATION: 95 % | BODY MASS INDEX: 30.75 KG/M2 | HEIGHT: 72 IN | DIASTOLIC BLOOD PRESSURE: 71 MMHG | WEIGHT: 227 LBS | RESPIRATION RATE: 18 BRPM | TEMPERATURE: 97.6 F

## 2023-01-12 DIAGNOSIS — K21.9 GERD (GASTROESOPHAGEAL REFLUX DISEASE): ICD-10-CM

## 2023-01-12 DIAGNOSIS — R10.13 DYSPEPSIA: ICD-10-CM

## 2023-01-12 PROCEDURE — 25010000002 PROPOFOL 10 MG/ML EMULSION

## 2023-01-12 PROCEDURE — 88300 SURGICAL PATH GROSS: CPT

## 2023-01-12 RX ORDER — LIDOCAINE HYDROCHLORIDE 20 MG/ML
INJECTION, SOLUTION INTRAVENOUS AS NEEDED
Status: DISCONTINUED | OUTPATIENT
Start: 2023-01-12 | End: 2023-01-12 | Stop reason: SURG

## 2023-01-12 RX ORDER — PROPOFOL 10 MG/ML
VIAL (ML) INTRAVENOUS AS NEEDED
Status: DISCONTINUED | OUTPATIENT
Start: 2023-01-12 | End: 2023-01-12 | Stop reason: SURG

## 2023-01-12 RX ORDER — SODIUM CHLORIDE 9 MG/ML
40 INJECTION, SOLUTION INTRAVENOUS AS NEEDED
Status: DISCONTINUED | OUTPATIENT
Start: 2023-01-12 | End: 2023-01-12 | Stop reason: HOSPADM

## 2023-01-12 RX ORDER — DEXTROSE AND SODIUM CHLORIDE 5; .45 G/100ML; G/100ML
30 INJECTION, SOLUTION INTRAVENOUS CONTINUOUS PRN
Status: DISCONTINUED | OUTPATIENT
Start: 2023-01-12 | End: 2023-01-12 | Stop reason: HOSPADM

## 2023-01-12 RX ADMIN — DEXTROSE AND SODIUM CHLORIDE 30 ML/HR: 5; 450 INJECTION, SOLUTION INTRAVENOUS at 09:23

## 2023-01-12 RX ADMIN — PROPOFOL 20 MG: 10 INJECTION, EMULSION INTRAVENOUS at 10:07

## 2023-01-12 RX ADMIN — LIDOCAINE HYDROCHLORIDE 100 MG: 20 INJECTION, SOLUTION INTRAVENOUS at 10:05

## 2023-01-12 RX ADMIN — PROPOFOL 150 MG: 10 INJECTION, EMULSION INTRAVENOUS at 10:05

## 2023-01-12 RX ADMIN — PROPOFOL 10 MG: 10 INJECTION, EMULSION INTRAVENOUS at 10:09

## 2023-01-12 RX ADMIN — PROPOFOL 20 MG: 10 INJECTION, EMULSION INTRAVENOUS at 10:08

## 2023-01-12 RX ADMIN — PROPOFOL 50 MG: 10 INJECTION, EMULSION INTRAVENOUS at 10:06

## 2023-01-12 NOTE — ANESTHESIA POSTPROCEDURE EVALUATION
Patient: Jung Selby    Procedure Summary     Date: 01/12/23 Room / Location: [facility] ENDOSCOPY 2 / [facility] ENDOSCOPY    Anesthesia Start: 0957 Anesthesia Stop: 1013    Procedure: ESOPHAGOGASTRODUODENOSCOPY 10:00 Diagnosis:       GERD (gastroesophageal reflux disease)      Dyspepsia      (GERD (gastroesophageal reflux disease) [K21.9])      (Dyspepsia [R10.13])    Surgeons: Nestor Echols DO Provider: Marta William CRNA    Anesthesia Type: general ASA Status: 2          Anesthesia Type: general    Vitals  No vitals data found for the desired time range.          Post Anesthesia Care and Evaluation    Patient location during evaluation: bedside  Patient participation: waiting for patient participation  Level of consciousness: responsive to verbal stimuli  Pain management: adequate    Airway patency: patent  Anesthetic complications: No anesthetic complications  PONV Status: none  Cardiovascular status: acceptable  Respiratory status: acceptable  Hydration status: acceptable    Comments: -------------------------              01/12/23                    0909        -------------------------   BP:         156/90        Pulse:        78          Resp:         18          Temp:   98.6 °F (37 °C)   SpO2:         97%        -------------------------

## 2023-01-12 NOTE — H&P
Nestor Jackman DO,[facility]  Gastroenterology  Hepatology  Endoscopy  Board Certified in Internal Medicine and gastroenterology  [address], [address]  [place]. [address]  - [phone]   F [phone]     GASTROENTEROLOGY HISTORY AND PHYSICAL  NOTE   NESTOR JACKMAN DO.         SUBJECTIVE:   1/12/2023    Name: Jung Selby  DOD: 1970      Chief Complaint:       Subjective : Acid reflux.  Despite the use of twice daily Protonix    Patient is 52 y.o. male presents with desire for elective EGD with biopsy.      ROS/HISTORY/ CURRENT MEDICATIONS/OBJECTIVE/VS/PE:   Review of Systems:  All systems unremarkable unless specified below.  Constitutional   HENT  Eyes   Respiratory    Cardiovascular  Gastrointestinal   Endocrine  Genitourinary    Musculoskeletal   Skin  Allergic/Immunologic    Neurological    Hematological  Psychiatric/Behavioral    History:     Past Medical History:   Diagnosis Date   • GERD (gastroesophageal reflux disease)    • Hx of eye surgery    • Perforated ear drum      Past Surgical History:   Procedure Laterality Date   • COLONOSCOPY N/A 06/20/2022    Procedure: COLONOSCOPY 3:30;  Surgeon: Nestor Jackman DO;  Location: [facility] ENDOSCOPY;  Service: Gastroenterology;  Laterality: N/A;   • CORNEAL TRANSPLANT Right    • TYMPANOPLASTY Left      History reviewed. No pertinent family history.  Social History     Tobacco Use   • Smoking status: Never   • Smokeless tobacco: Never   Vaping Use   • Vaping Use: Never used   Substance Use Topics   • Alcohol use: Not Currently   • Drug use: Never     Prior to Admission medications    Medication Sig Start Date End Date Taking? Authorizing Provider   cetirizine (zyrTEC) 10 MG tablet Take 10 mg by mouth Daily.   Yes Provider, MD Pawel   metoprolol tartrate (LOPRESSOR) 25 MG tablet Take 0.5 tablets by mouth Take As Directed. Night before and morning of CT scan 1/9/23  Yes Laura Wolf APRN   omeprazole (priLOSEC) 20 MG  capsule Take 20 mg by mouth Daily.   Yes Provider, MD Pawel   vitamin C (ASCORBIC ACID) 250 MG tablet Take 250 mg by mouth Daily.   Yes Provider, MD Pawel   nabumetone (RELAFEN) 750 MG tablet Take 1 tablet by mouth 2 (Two) Times a Day As Needed for Mild Pain. 1/9/23   Laura Wolf APRN     Allergies:  Penicillins    I have reviewed the patients medical history, surgical history and family history in the available medical record system.     Current Medications:     Current Facility-Administered Medications   Medication Dose Route Frequency Provider Last Rate Last Admin   • dextrose 5 % and sodium chloride 0.45 % infusion  30 mL/hr Intravenous Continuous PRN Nestor Echols DO       • sodium chloride 0.9 % infusion 40 mL  40 mL Intravenous PRN Nestor Echols DO           Objective     Physical Exam:   Temp:  [98.6 °F (37 °C)] 98.6 °F (37 °C)  Heart Rate:  [78] 78  Resp:  [18] 18  BP: (156)/(90) 156/90    Physical Exam:  General Appearance:    Alert, cooperative, in no acute distress   Head:    Normocephalic, without obvious abnormality, atraumatic   Eyes:            Lids and lashes normal, conjunctivae and sclerae normal, no icterus, no pallor, corneas clear, PERRLA   Ears:    Ears appear intact with no abnormalities noted   Throat:   No oral lesions, no thrush, oral mucosa moist   Neck:   No adenopathy, supple, trachea midline, no thyromegaly, no  carotid bruit, no JVD   Back:     No kyphosis present, no scoliosis present, no skin lesions,   erythema or scars, no tenderness to percussion or                 palpation,  range of motion normal   Lungs:     Clear to auscultation,respirations regular, even and         unlabored    Heart:    Regular rhythm and normal rate, normal S1 and S2, no  murmur, no gallop, no rub, no click   Breast Exam:    Deferred   Abdomen:     Normal bowel sounds, no masses, no organomegaly, soft  nontender, nondistended, no guarding, no rebound                  tenderness   Genitalia:    Deferred   Extremities:   Moves all extremities well, no edema, no cyanosis, no          redness   Pulses:   Pulses palpable and equal bilaterally   Skin:   No bleeding, bruising or rash   Lymph nodes:   No palpable adenopathy   Neurologic:   Cranial nerves 2 - 12 grossly intact, sensation intact, DTR     present and equal bilaterally      Results Review:     Lab Results   Component Value Date    WBC 7.45 01/06/2023    WBC 7.7 01/30/2019    HGB 14.4 01/06/2023    HGB 13.9 (L) 01/30/2019    HCT 43.5 01/06/2023    HCT 42.1 (L) 01/30/2019     01/06/2023     01/30/2019     Results from last 7 days   Lab Units 01/06/23  0944   ALK PHOS U/L 58   ALT (SGPT) U/L 31   AST (SGOT) U/L 18     Results from last 7 days   Lab Units 01/06/23  0944   BILIRUBIN mg/dL 0.6   ALK PHOS U/L 58     No results found for: LIPASE  No results found for: INR  No results found for: CULTURE    Radiology Review:  Imaging Results (Last 72 Hours)     ** No results found for the last 72 hours. **           I reviewed the patient's new clinical results.  I reviewed the patient's new imaging results and agree with the interpretation.     ASSESSMENT/PLAN:   ASSESSMENT:  1.  Acid reflux    PLAN:  1.  Esophagogastroduodenoscopy with biopsy    Risk and benefits associated with the procedure are reviewed with the patient.  The patient wished to proceed     Nestor Echols DO  01/12/23  09:20 CST

## 2023-01-12 NOTE — ANESTHESIA PREPROCEDURE EVALUATION
Anesthesia Evaluation     Patient summary reviewed and Nursing notes reviewed   no history of anesthetic complications:  NPO Solid Status: > 8 hours  NPO Liquid Status: > 2 hours           Airway   Mallampati: II  TM distance: >3 FB  Neck ROM: full  No difficulty expected  Dental    (+) partials    Pulmonary - negative pulmonary ROS and normal exam    breath sounds clear to auscultation  Cardiovascular - negative cardio ROS and normal exam    ECG reviewed  Patient on routine beta blocker  Rhythm: regular  Rate: normal      ROS comment: ECG 12 Lead       Date/Time: 1/6/2023 12:21 PM   Performed by: Devin Merida MD   Authorized by: Devin Merida MD   Interpreted by physician   Rhythm: sinus rhythm   Rate: normal   QRS axis: normal   ST Segments: ST segments normal   T Waves: T waves normal   Clinical impression: normal ECG     Neuro/Psych- negative ROS  GI/Hepatic/Renal/Endo    (+) obesity,  GERD,      Musculoskeletal (-) negative ROS    Abdominal   (+) obese,    Substance History      OB/GYN negative ob/gyn ROS         Other                        Anesthesia Plan    ASA 2     general   total IV anesthesia  intravenous induction     Anesthetic plan, risks, benefits, and alternatives have been provided, discussed and informed consent has been obtained with: patient.        CODE STATUS:

## 2023-01-13 LAB — REF LAB TEST METHOD: NORMAL

## 2023-01-23 ENCOUNTER — HOSPITAL ENCOUNTER (OUTPATIENT)
Dept: CT IMAGING | Facility: HOSPITAL | Age: 53
Discharge: HOME | End: 2023-01-23
Admitting: NURSE PRACTITIONER
Payer: COMMERCIAL

## 2023-01-23 VITALS
HEART RATE: 70 BPM | OXYGEN SATURATION: 97 % | RESPIRATION RATE: 18 BRPM | SYSTOLIC BLOOD PRESSURE: 134 MMHG | DIASTOLIC BLOOD PRESSURE: 84 MMHG

## 2023-01-23 DIAGNOSIS — R07.89 CHEST PAIN, ATYPICAL: ICD-10-CM

## 2023-01-23 DIAGNOSIS — R07.89 CHEST PAIN, ATYPICAL: Primary | ICD-10-CM

## 2023-01-23 DIAGNOSIS — E78.5 HYPERLIPIDEMIA LDL GOAL <70: ICD-10-CM

## 2023-01-23 PROCEDURE — 75574 CT ANGIO HRT W/3D IMAGE: CPT

## 2023-01-23 PROCEDURE — 0 IOPAMIDOL PER 1 ML: Performed by: NURSE PRACTITIONER

## 2023-01-23 RX ORDER — PRAVASTATIN SODIUM 10 MG
10 TABLET ORAL NIGHTLY
Qty: 90 TABLET | Refills: 1 | Status: SHIPPED | OUTPATIENT
Start: 2023-01-23

## 2023-01-23 RX ORDER — SODIUM CHLORIDE 9 MG/ML
100 INJECTION, SOLUTION INTRAVENOUS
Status: COMPLETED | OUTPATIENT
Start: 2023-01-23 | End: 2023-01-23

## 2023-01-23 RX ORDER — METOPROLOL TARTRATE 5 MG/5ML
INJECTION INTRAVENOUS AS NEEDED
Status: COMPLETED | OUTPATIENT
Start: 2023-01-23 | End: 2023-01-23

## 2023-01-23 RX ORDER — METOPROLOL TARTRATE 5 MG/5ML
INJECTION INTRAVENOUS
Status: DISPENSED
Start: 2023-01-23 | End: 2023-01-23

## 2023-01-23 RX ADMIN — IOPAMIDOL 90 ML: 755 INJECTION, SOLUTION INTRAVENOUS at 08:31

## 2023-01-23 RX ADMIN — SODIUM CHLORIDE 100 ML: 9 INJECTION, SOLUTION INTRAVENOUS at 08:30

## 2023-01-23 RX ADMIN — METOPROLOL TARTRATE 5 MG: 5 INJECTION INTRAVENOUS at 08:26

## 2023-02-03 ENCOUNTER — TELEPHONE (OUTPATIENT)
Dept: CARDIOLOGY | Facility: CLINIC | Age: 53
End: 2023-02-03
Payer: COMMERCIAL

## 2023-02-03 NOTE — TELEPHONE ENCOUNTER
----- Message from VIRGINIE Mccoy sent at 2/3/2023  4:31 PM CST -----  Overall relatively normal echo. Normal heart function and size. Mild mitral valve regurgitation/ leakiness. Nothing to explain symptoms.

## 2023-02-07 ENCOUNTER — OFFICE VISIT (OUTPATIENT)
Dept: CARDIOLOGY | Facility: CLINIC | Age: 53
End: 2023-02-07
Payer: COMMERCIAL

## 2023-02-07 VITALS
WEIGHT: 228.4 LBS | BODY MASS INDEX: 30.94 KG/M2 | DIASTOLIC BLOOD PRESSURE: 80 MMHG | SYSTOLIC BLOOD PRESSURE: 124 MMHG | HEIGHT: 72 IN | OXYGEN SATURATION: 98 % | HEART RATE: 84 BPM

## 2023-02-07 DIAGNOSIS — E78.5 HYPERLIPIDEMIA LDL GOAL <70: ICD-10-CM

## 2023-02-07 DIAGNOSIS — K21.9 GASTROESOPHAGEAL REFLUX DISEASE, UNSPECIFIED WHETHER ESOPHAGITIS PRESENT: ICD-10-CM

## 2023-02-07 DIAGNOSIS — I34.9 NONRHEUMATIC MITRAL VALVE DISORDER: ICD-10-CM

## 2023-02-07 DIAGNOSIS — I25.10 MILD CAD: ICD-10-CM

## 2023-02-07 DIAGNOSIS — R07.89 CHEST PAIN, ATYPICAL: Primary | ICD-10-CM

## 2023-02-07 PROCEDURE — 99214 OFFICE O/P EST MOD 30 MIN: CPT | Performed by: NURSE PRACTITIONER

## 2023-02-07 RX ORDER — SUCRALFATE 1 G/1
TABLET ORAL
COMMUNITY
Start: 2023-01-19

## 2023-02-07 RX ORDER — ASPIRIN 81 MG/1
81 TABLET ORAL DAILY
COMMUNITY

## 2023-02-07 NOTE — PROGRESS NOTES
"Chest Pain      Chest Pain   Pertinent negatives include no cough, dizziness, fever, headaches, irregular heartbeat, malaise/fatigue, palpitations, shortness of breath, syncope or weakness.   Pertinent negatives for past medical history include no seizures.       Mr. Jung Selby \"Trevon\" is a 52-year-old  male with medical history GERD, allergic rhinitis who presented for evaluation of chest pain.  He reports symptoms started in June when he thought he was having a heart attack.  He sought treatment and work-up was negative.  They started him on Prilosec and he had another episode in the fall work-up was negative again including troponins, EKG, x-rays.  They increased Prilosec at that time.  He reports having an ultrasound of his gallbladder was normal.  He was seen in the ER on 1-6-2023.  Patient describes left-sided chest pain, described as squeezing \"feels like a cramp\", radiating down left arm with numbness and tingling.  Patient reports he woke up with the symptoms. Symptoms have reoccurred several times. Worse when he wakes up in the morning. Will last 4 hrs, longest 8 hrs. Additional symptoms of diaphoresis. Denies recent injury or illness.     -ER work-up reviewed showing normal EKG, negative cardiac enzymes chest x-ray clear. He was given Nabumetone 750mg, which he reports has helped some. Denies symptoms being worsened after meals, movement or exertion.  He was evaluated by cardiology in 2019 by Dr. Parrish at [facility] in [facility].  Echocardiogram stress test was low risk, no ischemia noted. Holter monitor was normal.    He returns for follow up today. Reports seeing Dr. Echols, was told possibly was EOE, however biopsy was negative. He continues on prilosec and carafate which he feels is not helping. Pain is more dull, not as sharp. Symptoms worse and ramped up when waking in the morning. Symptoms improve with exercising and moving around. He exercises on the treadmill with no " problems.  He underwent coronary CTA which showed calcium score of 252.  Mild stenosis less than 50% in LAD and circumflex.  Echocardiogram showing normal EF, normal diastolic function, no regional wall motion abnormalities, mild mitral valve regurgitation.  Patient was recommended aspirin 81 mg and pravastatin 10 mg for prevention.        The 10-year ASCVD risk score (Omid ORTEGA, et al., 2019) is: 4.3%    Values used to calculate the score:      Age: 52 years      Sex: Male      Is Non- : No      Diabetic: No      Tobacco smoker: No      Systolic Blood Pressure: 124 mmHg      Is BP treated: No      HDL Cholesterol: 45 mg/dL      Total Cholesterol: 208 mg/dL    Past Medical History:   Diagnosis Date   • GERD (gastroesophageal reflux disease)    • Hx of eye surgery    • Perforated ear drum      Past Surgical History:   Procedure Laterality Date   • COLONOSCOPY N/A 06/20/2022    Procedure: COLONOSCOPY 3:30;  Surgeon: Nestor Echols DO;  Location: [facility] ENDOSCOPY;  Service: Gastroenterology;  Laterality: N/A;   • CORNEAL TRANSPLANT Right    • ENDOSCOPY N/A 1/12/2023    Procedure: ESOPHAGOGASTRODUODENOSCOPY 10:00;  Surgeon: Nestor Echols DO;  Location: [facility] ENDOSCOPY;  Service: Gastroenterology;  Laterality: N/A;   • TYMPANOPLASTY Left      Social History     Socioeconomic History   • Marital status:    Tobacco Use   • Smoking status: Never   • Smokeless tobacco: Never   Vaping Use   • Vaping Use: Never used   Substance and Sexual Activity   • Alcohol use: Not Currently   • Drug use: Never   • Sexual activity: Defer     History reviewed. No pertinent family history.    ALLERGIES:  Allergies   Allergen Reactions   • Penicillins Rash         Review of Systems   Constitutional: Negative for chills, fever, malaise/fatigue and weight gain.   HENT: Negative for nosebleeds and tinnitus.    Eyes: Negative for blurred vision and double vision.   Cardiovascular: Positive for chest pain.  Negative for dyspnea on exertion, irregular heartbeat, leg swelling, palpitations and syncope.   Respiratory: Negative for cough, shortness of breath, sleep disturbances due to breathing and snoring.    Endocrine: Negative for polydipsia, polyphagia and polyuria.   Hematologic/Lymphatic: Negative for bleeding problem. Does not bruise/bleed easily.   Skin: Negative for color change and suspicious lesions.   Musculoskeletal: Negative for falls and myalgias.   Gastrointestinal: Positive for heartburn. Negative for bloating and hematochezia.   Genitourinary: Negative for dysuria and hematuria.   Neurological: Negative for dizziness, headaches, seizures, vertigo and weakness.   Psychiatric/Behavioral: Negative for altered mental status and depression. The patient does not have insomnia and is not nervous/anxious.    Allergic/Immunologic: Negative for environmental allergies and persistent infections.       Current Outpatient Medications   Medication Sig Dispense Refill   • aspirin 81 MG EC tablet Take 81 mg by mouth Daily.     • cetirizine (zyrTEC) 10 MG tablet Take 10 mg by mouth Daily.     • nabumetone (RELAFEN) 750 MG tablet Take 1 tablet by mouth 2 (Two) Times a Day As Needed for Mild Pain. 14 tablet 0   • omeprazole (priLOSEC) 20 MG capsule Take 20 mg by mouth Daily.     • pravastatin (PRAVACHOL) 10 MG tablet Take 1 tablet by mouth Every Night. 90 tablet 1   • sucralfate (CARAFATE) 1 g tablet TAKE 1 TABLET BY MOUTH 4 TIMES DAILY BEFORE MEAL(S) AND AT BEDTIME     • vitamin C (ASCORBIC ACID) 250 MG tablet Take 250 mg by mouth Daily.       No current facility-administered medications for this visit.       OBJECTIVE:    Physical Exam:   Vitals reviewed.   Constitutional:       General: Not in acute distress.     Appearance: Normal appearance. Well-developed. Not toxic-appearing or diaphoretic.   Eyes:      General: Lids are normal.      Conjunctiva/sclera: Conjunctivae normal.   HENT:      Head: Normocephalic and  "atraumatic.      Right Ear: External ear normal.      Left Ear: External ear normal.   Neck:      Vascular: No JVD.   Pulmonary:      Effort: Pulmonary effort is normal. No respiratory distress.      Breath sounds: Normal breath sounds. No decreased breath sounds. No wheezing. No rales.   Chest:      Chest wall: Not tender to palpatation.   Cardiovascular:      PMI at left midclavicular line. Normal rate. Regular rhythm. Normal S1 with normal intensity. Normal S2 with normal intensity.      Murmurs: There is no murmur.      No gallop. No S3 and S4 gallop. No click. No rub.   Pulses:     Intact distal pulses. No decreased pulses.   Edema:     Peripheral edema absent.   Abdominal:      General: Bowel sounds are normal. There is no distension.      Palpations: Abdomen is soft.      Tenderness: There is no abdominal tenderness.   Musculoskeletal:      Cervical back: Normal range of motion and neck supple. Skin:     General: Skin is warm and dry.      Coloration: Skin is not pale.      Findings: No erythema or rash.   Neurological:      Mental Status: Alert and oriented to person, place, and time.      Gait: Gait normal.   Psychiatric:         Behavior: Behavior normal.         Thought Content: Thought content normal.         Judgment: Judgment normal.       Vitals:    02/07/23 0821   BP: 124/80   BP Location: Left arm   Patient Position: Sitting   Cuff Size: Adult   Pulse: 84   SpO2: 98%   Weight: 104 kg (228 lb 6.4 oz)   Height: 182.9 cm (72\")       DATA REVIEWED:   Results for orders placed in visit on 02/02/23    Adult Transthoracic Echo Complete w/ Color, Spectral and Contrast if Necessary Per Protocol    Interpretation Summary  •  Left ventricular ejection fraction appears to be 61 - 65%.  •  Left ventricular diastolic function was normal.  •  Left atrial volume is mildly increased.  •  Estimated right ventricular systolic pressure from tricuspid regurgitation is normal (<35 mmHg).      CT Angiogram " Coronary    Result Date: 1/23/2023  CONCLUSION: 1.  Patient's calcium score is 252.This places the patent in the mild coronary artery disease highly likely, significant narrowings possible risk for coronary artery disease. 2.  LAD: Proximal calcified atherosclerotic plaque causing mild stenosis of 50% or less. Mid calcified atherosclerotic plaque causing mild stenosis of less than 50%. No other calcified or soft tissue density plaque and/or stenosis. 3.  Circumflex: Mid calcified atherosclerotic plaque causing mild stenosis of less than 50%. No other calcified or soft tissue density plaque and/or stenosis. 4.  RCA: No calcified or soft tissue density plaque and no stenosis. Motion artifact. 5.  Abnormal high cardiac ejection fraction being 80%. Cannot exclude hypertrophic cardiomyopathy. 6.  Remainder CT angiogram coronary exam is unremarkable. Electronically signed by:  Arjun Gonazles MD  1/23/2023 10:03 AM CST Workstation: [id number]       Labs: BMP, CBC, LIPID, TSH  Lab Results   Component Value Date    GLUCOSE 117 (H) 01/06/2023    CALCIUM 9.6 01/06/2023     01/06/2023    K 3.9 01/06/2023    CO2 27.0 01/06/2023     01/06/2023    BUN 13 01/06/2023    CREATININE 0.97 01/06/2023    BCR 13.4 01/06/2023    ANIONGAP 12.0 01/06/2023     Lab Results   Component Value Date    WBC 7.45 01/06/2023    HGB 14.4 01/06/2023    HCT 43.5 01/06/2023    MCV 86.0 01/06/2023     01/06/2023     Lab Results   Component Value Date    CHOL 159 01/30/2019     Lab Results   Component Value Date    TRIG 220 (H) 04/20/2022    TRIG 180 01/30/2019     Lab Results   Component Value Date    HDL 45 04/20/2022    HDL 33 01/30/2019     No components found for: LDLCALC  Lab Results   Component Value Date     04/20/2022    LDL 96 01/30/2019     No results found for: HDLLDLRATIO  No components found for: CHOLHDL  Lab Results   Component Value Date    TSH 3.92 04/20/2022     Lab Results   Component Value Date    PROBNP <36.0  01/06/2023     EKG:     Stress Echocardiogram: 2019    RESTING ECHOCARDIOGRAM: The left ventricular size is normal. Ejection fraction is 60 percent. Wall motion appears normal. There were no valve abnormalities.     STRESS TEST RESULTS: The patient exercised for a total duration of 9:36. Achieved a maximum heart rate of 160 beats per minute which was 93% of target heart rate. The maximum blood pressure was 168/84.     SYMPTOMS: No chest discomfort lightheadness.     EKG FINDINGS: No ischemic ST-T changes.     POST-EXERCISE ECHOCARDIOGRAM: The post-exercise echocardiogram is a good quality study. All segments are adequately visualized and demonstrate improved contractility. No segments of ischemia are identified.    SUPERVISING PHYSICIAN: Dr Zapien    IMPRESSION: Normal stress echocardiogram.       The following portions of the patient's history were reviewed and updated as appropriate: allergies, current medications, past family history, past medical history, past social history, past surgical history and problem list.  Old records reviewed and pertinent information is included in the above objective data.     ASSESSMENT/PLAN:       Diagnosis Plan   1. Chest pain, atypical        2. Mild CAD        3. Gastroesophageal reflux disease, unspecified whether esophagitis present        4. Hyperlipidemia LDL goal <70        5. Nonrheumatic mitral valve disorder          #1. Chest pain syndrome. #2.  Mild CAD  Noted on coronary CTA.  However not severe enough to be causing patient's symptoms.  Patient's symptoms likely consistent with possible inflammation/arthritic causes. Should symptoms persist referral to rheumatology would be recommended.  -EKG is Normal  He underwent coronary CTA which showed calcium score of 252.  Mild stenosis less than 50% in LAD and circumflex.  Echocardiogram showing normal EF, normal diastolic function, no regional wall motion abnormalities, mild mitral valve regurgitation.  Patient was  recommended aspirin 81 mg and pravastatin 10 mg for prevention.    #2. GERD: Chronic. Following with Dr. Echols    #3. HLD: new. He was started on Pravastatin 10mg. Tolerating this well. Continue lifestyle changes with diet and exercise.     #4. MR: mild MR noted on echo. B/P is normal. He will be recommended to continue yearly clinical surveillance and periodic echocardiograms.     Follow up: 1 year.     I spent 32 minutes caring for Jung on this date of service. This time includes time spent by me in the following activities: preparing for the visit, reviewing tests, obtaining and/or reviewing a separately obtained history, performing a medically appropriate examination and/or evaluation, counseling and educating the patient/family/caregiver, documenting information in the medical record and independently interpreting results and communicating that information with the patient/family/caregiver-               This document has been electronically signed by VIRGINIE Mccoy on February 7, 2023 09:38 CST

## 2023-09-23 ENCOUNTER — APPOINTMENT (OUTPATIENT)
Dept: GENERAL RADIOLOGY | Facility: HOSPITAL | Age: 53
End: 2023-09-23
Payer: COMMERCIAL

## 2023-09-23 ENCOUNTER — HOSPITAL ENCOUNTER (OUTPATIENT)
Facility: HOSPITAL | Age: 53
Setting detail: OBSERVATION
Discharge: HOME | End: 2023-09-26
Attending: FAMILY MEDICINE | Admitting: STUDENT IN AN ORGANIZED HEALTH CARE EDUCATION/TRAINING PROGRAM
Payer: COMMERCIAL

## 2023-09-23 DIAGNOSIS — R07.2 PRECORDIAL PAIN: Primary | ICD-10-CM

## 2023-09-23 PROBLEM — E03.9 HYPOTHYROIDISM: Status: ACTIVE | Noted: 2023-09-23

## 2023-09-23 PROBLEM — R07.9 CHEST PAIN: Status: RESOLVED | Noted: 2023-09-23 | Resolved: 2023-09-23

## 2023-09-23 PROBLEM — R07.9 CHEST PAIN: Status: ACTIVE | Noted: 2023-09-23

## 2023-09-23 PROBLEM — F41.1 GAD (GENERALIZED ANXIETY DISORDER): Status: ACTIVE | Noted: 2023-09-23

## 2023-09-23 LAB
ALBUMIN SERPL-MCNC: 4.8 G/DL (ref 3.5–5.2)
ALBUMIN/GLOB SERPL: 1.7 G/DL
ALP SERPL-CCNC: 49 U/L (ref 39–117)
ALT SERPL W P-5'-P-CCNC: 39 U/L (ref 1–41)
ANION GAP SERPL CALCULATED.3IONS-SCNC: 11 MMOL/L (ref 5–15)
ARTERIAL PATENCY WRIST A: ABNORMAL
AST SERPL-CCNC: 20 U/L (ref 1–40)
ATMOSPHERIC PRESS: 750 MMHG
BASE EXCESS BLDA CALC-SCNC: 1.1 MMOL/L (ref 0–2)
BASOPHILS # BLD AUTO: 0.04 10*3/MM3 (ref 0–0.2)
BASOPHILS NFR BLD AUTO: 0.5 % (ref 0–1.5)
BDY SITE: ABNORMAL
BILIRUB SERPL-MCNC: 0.4 MG/DL (ref 0–1.2)
BUN SERPL-MCNC: 15 MG/DL (ref 6–20)
BUN/CREAT SERPL: 16.1 (ref 7–25)
CALCIUM SPEC-SCNC: 9.9 MG/DL (ref 8.6–10.5)
CHLORIDE SERPL-SCNC: 101 MMOL/L (ref 98–107)
CK SERPL-CCNC: 127 U/L (ref 20–200)
CO2 SERPL-SCNC: 27 MMOL/L (ref 22–29)
CREAT SERPL-MCNC: 0.93 MG/DL (ref 0.76–1.27)
DEPRECATED RDW RBC AUTO: 37.6 FL (ref 37–54)
EGFRCR SERPLBLD CKD-EPI 2021: 99.4 ML/MIN/1.73
EOSINOPHIL # BLD AUTO: 0.04 10*3/MM3 (ref 0–0.4)
EOSINOPHIL NFR BLD AUTO: 0.5 % (ref 0.3–6.2)
ERYTHROCYTE [DISTWIDTH] IN BLOOD BY AUTOMATED COUNT: 11.7 % (ref 12.3–15.4)
GEN 5 2HR TROPONIN T REFLEX: <6 NG/L
GLOBULIN UR ELPH-MCNC: 2.9 GM/DL
GLUCOSE SERPL-MCNC: 116 MG/DL (ref 65–99)
HCO3 BLDA-SCNC: 25.8 MMOL/L (ref 20–26)
HCT VFR BLD AUTO: 42.9 % (ref 37.5–51)
HGB BLD-MCNC: 14.6 G/DL (ref 13–17.7)
HOLD SPECIMEN: NORMAL
IMM GRANULOCYTES # BLD AUTO: 0.04 10*3/MM3 (ref 0–0.05)
IMM GRANULOCYTES NFR BLD AUTO: 0.5 % (ref 0–0.5)
INHALED O2 CONCENTRATION: 21 %
LIPASE SERPL-CCNC: 27 U/L (ref 13–60)
LYMPHOCYTES # BLD AUTO: 1.35 10*3/MM3 (ref 0.7–3.1)
LYMPHOCYTES NFR BLD AUTO: 16 % (ref 19.6–45.3)
Lab: ABNORMAL
MAGNESIUM SERPL-MCNC: 2 MG/DL (ref 1.6–2.6)
MCH RBC QN AUTO: 29.9 PG (ref 26.6–33)
MCHC RBC AUTO-ENTMCNC: 34 G/DL (ref 31.5–35.7)
MCV RBC AUTO: 87.7 FL (ref 79–97)
MODALITY: ABNORMAL
MONOCYTES # BLD AUTO: 0.39 10*3/MM3 (ref 0.1–0.9)
MONOCYTES NFR BLD AUTO: 4.6 % (ref 5–12)
NEUTROPHILS NFR BLD AUTO: 6.6 10*3/MM3 (ref 1.7–7)
NEUTROPHILS NFR BLD AUTO: 77.9 % (ref 42.7–76)
NRBC BLD AUTO-RTO: 0 /100 WBC (ref 0–0.2)
NT-PROBNP SERPL-MCNC: <36 PG/ML (ref 0–900)
PCO2 BLDA: 40.8 MM HG (ref 35–45)
PH BLDA: 7.41 PH UNITS (ref 7.35–7.45)
PLATELET # BLD AUTO: 162 10*3/MM3 (ref 140–450)
PMV BLD AUTO: 9.4 FL (ref 6–12)
PO2 BLDA: 75.1 MM HG (ref 83–108)
POTASSIUM SERPL-SCNC: 4.2 MMOL/L (ref 3.5–5.2)
PROT SERPL-MCNC: 7.7 G/DL (ref 6–8.5)
RBC # BLD AUTO: 4.89 10*6/MM3 (ref 4.14–5.8)
SAO2 % BLDCOA: 96.6 % (ref 94–99)
SODIUM SERPL-SCNC: 139 MMOL/L (ref 136–145)
TROPONIN T DELTA: NORMAL
TROPONIN T SERPL HS-MCNC: <6 NG/L
TSH SERPL DL<=0.05 MIU/L-ACNC: 1.46 UIU/ML (ref 0.27–4.2)
VENTILATOR MODE: ABNORMAL
WBC NRBC COR # BLD: 8.46 10*3/MM3 (ref 3.4–10.8)
WHOLE BLOOD HOLD COAG: NORMAL

## 2023-09-23 PROCEDURE — G0378 HOSPITAL OBSERVATION PER HR: HCPCS

## 2023-09-23 PROCEDURE — 83735 ASSAY OF MAGNESIUM: CPT | Performed by: FAMILY MEDICINE

## 2023-09-23 PROCEDURE — 82803 BLOOD GASES ANY COMBINATION: CPT

## 2023-09-23 PROCEDURE — 80050 GENERAL HEALTH PANEL: CPT | Performed by: FAMILY MEDICINE

## 2023-09-23 PROCEDURE — 99284 EMERGENCY DEPT VISIT MOD MDM: CPT

## 2023-09-23 PROCEDURE — 83690 ASSAY OF LIPASE: CPT | Performed by: FAMILY MEDICINE

## 2023-09-23 PROCEDURE — 25010000002 ENOXAPARIN PER 10 MG

## 2023-09-23 PROCEDURE — 36600 WITHDRAWAL OF ARTERIAL BLOOD: CPT

## 2023-09-23 PROCEDURE — 83880 ASSAY OF NATRIURETIC PEPTIDE: CPT | Performed by: FAMILY MEDICINE

## 2023-09-23 PROCEDURE — 96375 TX/PRO/DX INJ NEW DRUG ADDON: CPT

## 2023-09-23 PROCEDURE — 25010000002 MORPHINE PER 10 MG

## 2023-09-23 PROCEDURE — 96372 THER/PROPH/DIAG INJ SC/IM: CPT

## 2023-09-23 PROCEDURE — 93005 ELECTROCARDIOGRAM TRACING: CPT

## 2023-09-23 PROCEDURE — 82550 ASSAY OF CK (CPK): CPT | Performed by: FAMILY MEDICINE

## 2023-09-23 PROCEDURE — 94761 N-INVAS EAR/PLS OXIMETRY MLT: CPT

## 2023-09-23 PROCEDURE — 84484 ASSAY OF TROPONIN QUANT: CPT | Performed by: FAMILY MEDICINE

## 2023-09-23 PROCEDURE — 99222 1ST HOSP IP/OBS MODERATE 55: CPT

## 2023-09-23 PROCEDURE — 93005 ELECTROCARDIOGRAM TRACING: CPT | Performed by: FAMILY MEDICINE

## 2023-09-23 PROCEDURE — 71046 X-RAY EXAM CHEST 2 VIEWS: CPT

## 2023-09-23 PROCEDURE — 36415 COLL VENOUS BLD VENIPUNCTURE: CPT

## 2023-09-23 PROCEDURE — 96374 THER/PROPH/DIAG INJ IV PUSH: CPT

## 2023-09-23 RX ORDER — BISACODYL 5 MG/1
5 TABLET, DELAYED RELEASE ORAL DAILY PRN
Status: DISCONTINUED | OUTPATIENT
Start: 2023-09-23 | End: 2023-09-26 | Stop reason: HOSPADM

## 2023-09-23 RX ORDER — SODIUM CHLORIDE 0.9 % (FLUSH) 0.9 %
10 SYRINGE (ML) INJECTION EVERY 12 HOURS SCHEDULED
Status: DISCONTINUED | OUTPATIENT
Start: 2023-09-23 | End: 2023-09-26 | Stop reason: HOSPADM

## 2023-09-23 RX ORDER — AMOXICILLIN 250 MG
2 CAPSULE ORAL 2 TIMES DAILY
Status: DISCONTINUED | OUTPATIENT
Start: 2023-09-23 | End: 2023-09-26 | Stop reason: HOSPADM

## 2023-09-23 RX ORDER — CITALOPRAM 20 MG/1
20 TABLET ORAL DAILY
Qty: 30 TABLET | Refills: 3 | Status: ON HOLD | COMMUNITY
Start: 2023-09-21 | End: 2023-09-26 | Stop reason: SINTOL

## 2023-09-23 RX ORDER — MORPHINE SULFATE 2 MG/ML
2 INJECTION, SOLUTION INTRAMUSCULAR; INTRAVENOUS EVERY 4 HOURS PRN
Status: DISCONTINUED | OUTPATIENT
Start: 2023-09-23 | End: 2023-09-24

## 2023-09-23 RX ORDER — LEVOTHYROXINE SODIUM 0.03 MG/1
25 TABLET ORAL
Status: DISCONTINUED | OUTPATIENT
Start: 2023-09-24 | End: 2023-09-26 | Stop reason: HOSPADM

## 2023-09-23 RX ORDER — PRAVASTATIN SODIUM 10 MG
10 TABLET ORAL NIGHTLY
Status: DISCONTINUED | OUTPATIENT
Start: 2023-09-23 | End: 2023-09-26 | Stop reason: HOSPADM

## 2023-09-23 RX ORDER — NALOXONE HYDROCHLORIDE 0.4 MG/ML
0.4 INJECTION, SOLUTION INTRAMUSCULAR; INTRAVENOUS; SUBCUTANEOUS AS NEEDED
Status: DISCONTINUED | OUTPATIENT
Start: 2023-09-23 | End: 2023-09-26 | Stop reason: HOSPADM

## 2023-09-23 RX ORDER — BISACODYL 10 MG
10 SUPPOSITORY, RECTAL RECTAL DAILY PRN
Status: DISCONTINUED | OUTPATIENT
Start: 2023-09-23 | End: 2023-09-26 | Stop reason: HOSPADM

## 2023-09-23 RX ORDER — CITALOPRAM 20 MG/1
20 TABLET ORAL DAILY
Status: DISCONTINUED | OUTPATIENT
Start: 2023-09-23 | End: 2023-09-24

## 2023-09-23 RX ORDER — SODIUM CHLORIDE 9 MG/ML
40 INJECTION, SOLUTION INTRAVENOUS AS NEEDED
Status: DISCONTINUED | OUTPATIENT
Start: 2023-09-23 | End: 2023-09-26 | Stop reason: HOSPADM

## 2023-09-23 RX ORDER — ONDANSETRON 2 MG/ML
4 INJECTION INTRAMUSCULAR; INTRAVENOUS EVERY 6 HOURS PRN
Status: DISCONTINUED | OUTPATIENT
Start: 2023-09-23 | End: 2023-09-26 | Stop reason: HOSPADM

## 2023-09-23 RX ORDER — ONDANSETRON 4 MG/1
4 TABLET, FILM COATED ORAL EVERY 6 HOURS PRN
Status: DISCONTINUED | OUTPATIENT
Start: 2023-09-23 | End: 2023-09-26 | Stop reason: HOSPADM

## 2023-09-23 RX ORDER — SODIUM CHLORIDE 0.9 % (FLUSH) 0.9 %
10 SYRINGE (ML) INJECTION AS NEEDED
Status: DISCONTINUED | OUTPATIENT
Start: 2023-09-23 | End: 2023-09-26 | Stop reason: HOSPADM

## 2023-09-23 RX ORDER — ASPIRIN 81 MG/1
81 TABLET ORAL DAILY
Status: DISCONTINUED | OUTPATIENT
Start: 2023-09-24 | End: 2023-09-24

## 2023-09-23 RX ORDER — MORPHINE SULFATE 2 MG/ML
2 INJECTION, SOLUTION INTRAMUSCULAR; INTRAVENOUS
Status: DISCONTINUED | OUTPATIENT
Start: 2023-09-23 | End: 2023-09-23

## 2023-09-23 RX ORDER — CETIRIZINE HYDROCHLORIDE 10 MG/1
10 TABLET ORAL DAILY
Status: DISCONTINUED | OUTPATIENT
Start: 2023-09-23 | End: 2023-09-25

## 2023-09-23 RX ORDER — LEVOTHYROXINE SODIUM 0.03 MG/1
25 TABLET ORAL
COMMUNITY
Start: 2023-09-07

## 2023-09-23 RX ORDER — PANTOPRAZOLE SODIUM 40 MG/1
40 TABLET, DELAYED RELEASE ORAL
Status: DISCONTINUED | OUTPATIENT
Start: 2023-09-24 | End: 2023-09-26 | Stop reason: HOSPADM

## 2023-09-23 RX ORDER — NITROGLYCERIN 0.4 MG/1
0.4 TABLET SUBLINGUAL
Status: DISCONTINUED | OUTPATIENT
Start: 2023-09-23 | End: 2023-09-23

## 2023-09-23 RX ORDER — POLYETHYLENE GLYCOL 3350 17 G/17G
17 POWDER, FOR SOLUTION ORAL DAILY PRN
Status: DISCONTINUED | OUTPATIENT
Start: 2023-09-23 | End: 2023-09-26 | Stop reason: HOSPADM

## 2023-09-23 RX ORDER — CHOLECALCIFEROL (VITAMIN D3) 125 MCG
5 CAPSULE ORAL NIGHTLY PRN
Status: DISCONTINUED | OUTPATIENT
Start: 2023-09-23 | End: 2023-09-26 | Stop reason: HOSPADM

## 2023-09-23 RX ORDER — ENOXAPARIN SODIUM 100 MG/ML
40 INJECTION SUBCUTANEOUS DAILY
Status: DISCONTINUED | OUTPATIENT
Start: 2023-09-23 | End: 2023-09-26 | Stop reason: HOSPADM

## 2023-09-23 RX ADMIN — ENOXAPARIN SODIUM 40 MG: 40 INJECTION SUBCUTANEOUS at 15:52

## 2023-09-23 RX ADMIN — MORPHINE SULFATE 2 MG: 2 INJECTION, SOLUTION INTRAMUSCULAR; INTRAVENOUS at 20:14

## 2023-09-23 RX ADMIN — Medication 10 ML: at 15:53

## 2023-09-23 RX ADMIN — SODIUM CHLORIDE 1000 ML: 9 INJECTION, SOLUTION INTRAVENOUS at 10:41

## 2023-09-23 RX ADMIN — PRAVASTATIN SODIUM 10 MG: 10 TABLET ORAL at 20:16

## 2023-09-23 RX ADMIN — CITALOPRAM HYDROBROMIDE 20 MG: 20 TABLET ORAL at 20:15

## 2023-09-23 RX ADMIN — Medication 10 ML: at 20:16

## 2023-09-23 RX ADMIN — Medication 5 MG: at 20:14

## 2023-09-23 RX ADMIN — NITROGLYCERIN 0.4 MG: 0.4 TABLET, ORALLY DISINTEGRATING SUBLINGUAL at 10:27

## 2023-09-23 NOTE — H&P
"    HISTORY AND PHYSICAL  NAME: Jung Selby  : 1970  MRN: [id number]    DATE OF ADMISSION:  2023     DATE & TIME SEEN: 23 at 1345    PCP: Giselle Richards APRN    CODE STATUS:  Code Status and Medical Interventions:   Ordered at: 23 1410     Level Of Support Discussed With:    Patient     Code Status (Patient has no pulse and is not breathing):    CPR (Attempt to Resuscitate)     Medical Interventions (Patient has pulse or is breathing):    Full Support         CHIEF COMPLAINT: Chest pain    HPI:  Jung Selby is a 52 y.o. male with a CMH of hypothyroidism, GERD, HLD, mild CAD who presents with acute chest pain.  Patient states he was awoken from sleep around 5 AM with \"hot flashing, like tingling/burning across my chest\" located on the left side of his chest and radiating, which persisted for 3 hours. He endorses sweating but no nausea, palpitations, shortness of breath, or lightheadedness. Patient reports a history of chest pain \"episodes\"similar to this and persistent chest pain starting back in 2022.  He has seen multiple specialties including GI, rheumatology, and cardiology for evaluation of this ongoing pain.  He underwent full GI upper endoscopy and cardiac work-up and was prescribed Carafate and anti-inflammatories for possible GI manifestation was discontinued due to inefficacy of treatment.  Past cardiac work-up included echo significant for LVEF 61 to 65% and CT angiogram coronary significant for mild coronary artery disease and mild stenosis of multiple vessels.  Of note, he does state that although the chest pain is constant it does seem to improve with activity and increasing his heart rate.      CONCURRENT MEDICAL HISTORY:  Past Medical History:   Diagnosis Date    Disease of thyroid gland     GERD (gastroesophageal reflux disease)     Hx of eye surgery     Perforated ear drum        PAST SURGICAL HISTORY:  Past Surgical History:   Procedure " Laterality Date    COLONOSCOPY N/A 06/20/2022    Procedure: COLONOSCOPY 3:30;  Surgeon: Nestor Echols DO;  Location: [facility] ENDOSCOPY;  Service: Gastroenterology;  Laterality: N/A;    CORNEAL TRANSPLANT Right     ENDOSCOPY N/A 1/12/2023    Procedure: ESOPHAGOGASTRODUODENOSCOPY 10:00;  Surgeon: Nestor Echols DO;  Location: [facility] ENDOSCOPY;  Service: Gastroenterology;  Laterality: N/A;    TYMPANOPLASTY Left        FAMILY HISTORY:  History reviewed. No pertinent family history.     SOCIAL HISTORY:  Social History     Socioeconomic History    Marital status:    Tobacco Use    Smoking status: Never    Smokeless tobacco: Never   Vaping Use    Vaping Use: Never used   Substance and Sexual Activity    Alcohol use: Not Currently    Drug use: Never    Sexual activity: Defer       HOME MEDICATIONS:  Prior to Admission medications    Medication Sig Start Date End Date Taking? Authorizing Provider   aspirin 81 MG EC tablet Take 1 tablet by mouth Daily.   Yes Pawel Og MD   cetirizine (zyrTEC) 10 MG tablet Take 1 tablet by mouth Daily.   Yes Pawel Og MD   citalopram (CeleXA) 20 MG tablet Take 1 tablet by mouth Daily. 9/21/23 12/21/23 Yes Pawel Og MD   levothyroxine (SYNTHROID, LEVOTHROID) 25 MCG tablet Take 1 tablet by mouth. 9/7/23  Yes Pawel Og MD   omeprazole (priLOSEC) 20 MG capsule Take 1 capsule by mouth Daily.   Yes Pawel Og MD   pravastatin (PRAVACHOL) 10 MG tablet Take 1 tablet by mouth Every Night. 7/14/23  Yes Laura Wolf APRN   vitamin C (ASCORBIC ACID) 250 MG tablet Take 1 tablet by mouth Daily.   Yes Pawel Og MD   nabumetone (RELAFEN) 750 MG tablet Take 1 tablet by mouth 2 (Two) Times a Day As Needed for Mild Pain. 1/9/23   Laura Wolf APRN   sucralfate (CARAFATE) 1 g tablet TAKE 1 TABLET BY MOUTH 4 TIMES DAILY BEFORE MEAL(S) AND AT BEDTIME 1/19/23   Pawel Og MD        ALLERGIES:  Penicillins    REVIEW OF SYSTEMS  Review of Systems   Constitutional:  Negative for activity change, appetite change, chills, fatigue, fever and unexpected weight change.   HENT:  Negative for congestion, postnasal drip, rhinorrhea, sinus pressure, sinus pain, sneezing, sore throat and trouble swallowing.    Eyes:  Negative for discharge, itching and visual disturbance.   Respiratory:  Positive for chest tightness. Negative for cough, shortness of breath, wheezing and stridor.    Cardiovascular:  Positive for chest pain. Negative for palpitations and leg swelling.   Gastrointestinal:  Negative for abdominal distention, abdominal pain, constipation, diarrhea, nausea and vomiting.   Genitourinary:  Negative for decreased urine volume, difficulty urinating and dysuria.   Musculoskeletal:  Negative for neck pain and neck stiffness.   Skin:  Negative for color change and rash.   Neurological:  Negative for dizziness, syncope, facial asymmetry, weakness, light-headedness and headaches.   Psychiatric/Behavioral:  Negative for confusion. The patient is nervous/anxious.      PHYSICAL EXAM:  Temp:  [97.5 °F (36.4 °C)-97.9 °F (36.6 °C)] 97.5 °F (36.4 °C)  Heart Rate:  [62-86] 70  Resp:  [18-22] 18  BP: ()/(52-98) 131/81  Body mass index is 29.89 kg/m².  Physical Exam  Vitals reviewed.   Constitutional:       General: He is not in acute distress.     Appearance: Normal appearance. He is not ill-appearing or diaphoretic.   HENT:      Head: Normocephalic and atraumatic.      Nose: Nose normal.      Mouth/Throat:      Mouth: Mucous membranes are moist.   Eyes:      General: No scleral icterus.     Extraocular Movements: Extraocular movements intact.      Pupils: Pupils are equal, round, and reactive to light.   Neck:      Vascular: No carotid bruit.   Cardiovascular:      Rate and Rhythm: Normal rate and regular rhythm.      Pulses: Normal pulses.      Heart sounds: Normal heart sounds. No murmur heard.     No friction rub. No gallop.   Pulmonary:      Effort: Pulmonary effort is normal.      Breath sounds: Normal breath sounds. No wheezing, rhonchi or rales.   Abdominal:      General: Abdomen is flat. Bowel sounds are normal. There is no distension.      Palpations: Abdomen is soft.      Tenderness: There is no abdominal tenderness.   Musculoskeletal:      Cervical back: Normal range of motion and neck supple.      Right lower leg: No edema.      Left lower leg: No edema.   Lymphadenopathy:      Cervical: No cervical adenopathy.   Skin:     General: Skin is warm.      Capillary Refill: Capillary refill takes less than 2 seconds.   Neurological:      General: No focal deficit present.      Mental Status: He is alert and oriented to person, place, and time.      Cranial Nerves: No cranial nerve deficit.      Motor: No weakness.   Psychiatric:         Mood and Affect: Mood normal.         Behavior: Behavior normal.       DIAGNOSTIC DATA:   Lab Results (last 24 hours)       Procedure Component Value Units Date/Time    High Sensitivity Troponin T 2Hr [[id number]] Collected: 09/23/23 1206    Specimen: Blood Updated: 09/23/23 1240     HS Troponin T <6 ng/L      Troponin T Delta --     Comment: Unable to calculate.       Narrative:      High Sensitive Troponin T Reference Range:  <10.0 ng/L- Negative Female for AMI  <15.0 ng/L- Negative Male for AMI  >=10 - Abnormal Female indicating possible myocardial injury.  >=15 - Abnormal Male indicating possible myocardial injury.   Clinicians would have to utilize clinical acumen, EKG, Troponin, and serial changes to determine if it is an Acute Myocardial Infarction or myocardial injury due to an underlying chronic condition.         Extra Tubes [[id number]] Collected: 09/23/23 1011    Specimen: Blood, Venous Line Updated: 09/23/23 1116    Narrative:      The following orders were created for panel order Extra Tubes.  Procedure                               Abnormality         Status                      ---------                               -----------         ------                     Gold Top - SST[[id number]]                                   Final result               Light Blue Top[[id number]]                                   Final result                 Please view results for these tests on the individual orders.    Gold Top - SST [[id number]] Collected: 09/23/23 1011    Specimen: Blood Updated: 09/23/23 1116     Extra Tube Hold for add-ons.     Comment: Auto resulted.       Light Blue Top [[id number]] Collected: 09/23/23 1011    Specimen: Blood Updated: 09/23/23 1116     Extra Tube Hold for add-ons.     Comment: Auto resulted       BNP [[id number]]  (Normal) Collected: 09/23/23 1011    Specimen: Blood Updated: 09/23/23 1050     proBNP <36.0 pg/mL     Narrative:      Among patients with dyspnea, NT-proBNP is highly sensitive for the detection of acute congestive heart failure. In addition NT-proBNP of <300 pg/ml effectively rules out acute congestive heart failure with 99% negative predictive value.      High Sensitivity Troponin T [[id number]]  (Normal) Collected: 09/23/23 1011    Specimen: Blood Updated: 09/23/23 1048     HS Troponin T <6 ng/L     Narrative:      High Sensitive Troponin T Reference Range:  <10.0 ng/L- Negative Female for AMI  <15.0 ng/L- Negative Male for AMI  >=10 - Abnormal Female indicating possible myocardial injury.  >=15 - Abnormal Male indicating possible myocardial injury.   Clinicians would have to utilize clinical acumen, EKG, Troponin, and serial changes to determine if it is an Acute Myocardial Infarction or myocardial injury due to an underlying chronic condition.         TSH [[id number]]  (Normal) Collected: 09/23/23 1011    Specimen: Blood Updated: 09/23/23 1048     TSH 1.460 uIU/mL     Comprehensive Metabolic Panel [[id number]]  (Abnormal) Collected: 09/23/23 1011    Specimen: Blood Updated: 09/23/23 1042     Glucose 116 mg/dL      BUN 15 mg/dL      Creatinine  0.93 mg/dL      Sodium 139 mmol/L      Potassium 4.2 mmol/L      Chloride 101 mmol/L      CO2 27.0 mmol/L      Calcium 9.9 mg/dL      Total Protein 7.7 g/dL      Albumin 4.8 g/dL      ALT (SGPT) 39 U/L      AST (SGOT) 20 U/L      Alkaline Phosphatase 49 U/L      Total Bilirubin 0.4 mg/dL      Globulin 2.9 gm/dL      A/G Ratio 1.7 g/dL      BUN/Creatinine Ratio 16.1     Anion Gap 11.0 mmol/L      eGFR 99.4 mL/min/1.73     Narrative:      GFR Normal >60  Chronic Kidney Disease <60  Kidney Failure <15      Lipase [[id number]]  (Normal) Collected: 09/23/23 1011    Specimen: Blood Updated: 09/23/23 1042     Lipase 27 U/L     CK [[id number]]  (Normal) Collected: 09/23/23 1011    Specimen: Blood Updated: 09/23/23 1042     Creatine Kinase 127 U/L     Magnesium [[id number]]  (Normal) Collected: 09/23/23 1011    Specimen: Blood Updated: 09/23/23 1042     Magnesium 2.0 mg/dL     CBC & Differential [[id number]]  (Abnormal) Collected: 09/23/23 1011    Specimen: Blood Updated: 09/23/23 1020    Narrative:      The following orders were created for panel order CBC & Differential.  Procedure                               Abnormality         Status                     ---------                               -----------         ------                     CBC Auto Differential[[id number]]        Abnormal            Final result                 Please view results for these tests on the individual orders.    CBC Auto Differential [[id number]]  (Abnormal) Collected: 09/23/23 1011    Specimen: Blood Updated: 09/23/23 1020     WBC 8.46 10*3/mm3      RBC 4.89 10*6/mm3      Hemoglobin 14.6 g/dL      Hematocrit 42.9 %      MCV 87.7 fL      MCH 29.9 pg      MCHC 34.0 g/dL      RDW 11.7 %      RDW-SD 37.6 fl      MPV 9.4 fL      Platelets 162 10*3/mm3      Neutrophil % 77.9 %      Lymphocyte % 16.0 %      Monocyte % 4.6 %      Eosinophil % 0.5 %      Basophil % 0.5 %      Immature Grans % 0.5 %      Neutrophils, Absolute 6.60 10*3/mm3       Lymphocytes, Absolute 1.35 10*3/mm3      Monocytes, Absolute 0.39 10*3/mm3      Eosinophils, Absolute 0.04 10*3/mm3      Basophils, Absolute 0.04 10*3/mm3      Immature Grans, Absolute 0.04 10*3/mm3      nRBC 0.0 /100 WBC     Blood Gas, Arterial - [[id number]]  (Abnormal) Collected: 09/23/23 1016    Specimen: Arterial Blood Updated: 09/23/23 1014     Site Right Radial     Lalo's Test N/A     pH, Arterial 7.410 pH units      pCO2, Arterial 40.8 mm Hg      pO2, Arterial 75.1 mm Hg      Comment: 84 Value below reference range        HCO3, Arterial 25.8 mmol/L      Base Excess, Arterial 1.1 mmol/L      O2 Saturation, Arterial 96.6 %      Barometric Pressure for Blood Gas 750 mmHg      Modality Room Air     FIO2 21 %      Ventilator Mode NA     Collected by kw     Comment: Meter: [id number]     :  [id number]                Imaging Results (Last 24 Hours)       Procedure Component Value Units Date/Time    XR Chest PA & Lateral [[id number]] Collected: 09/23/23 1446     Updated: 09/23/23 1532    Narrative:      INDICATION:  Chest pain.    FINDINGS:  The heart size and pulmonary vascularity are normal.  The lungs are clear.  The  mediastinum, carlton and visualized osseous structures are unremarkable.      Impression:      No significant abnormality of the chest.              I reviewed the patient's new clinical results.    ASSESSMENT AND PLAN:     Active and Resolved Problems  Active Hospital Problems    Diagnosis  POA    **Chest pain, atypical [R07.89]  Yes    Hypothyroidism [E03.9]  Unknown    DONNELL (generalized anxiety disorder) [F41.1]  Unknown    Mild CAD [I25.10]  Yes    Hyperlipidemia LDL goal <70 [E78.5]  Yes    Gastroesophageal reflux disease [K21.9]  Yes      Resolved Hospital Problems   No resolved problems to display.       52 y.o. male with a CMH of hypothyroidism, GERD, HLD, mild CAD admitted for chest pain rule out.  Vital signs stable, afebrile, respiratory status stable on room air SPO2 97%.  EKG  normal, CK, trops, proBNP normal.  TSH and lipase WNL.  CXR unremarkable for cardiopulmonary findings.  No current evidence or concern for PE.  In the ED he was given a L bolus fluids and nitro for chest pain, however he became hypotensive after nitro but BP recovered once placed in Trendelenburg.  Will use morphine for chest pain control rather than nitro due to hypotensive event.  Will continue cardiac work-up and consult cardiology.  Patient has no significant history of smoking, hypertension, or ACS.  Echo pending.    Chest Pain:  -Cardiology consulted, appreciate recs  -trops < 6  -proBNP < 36.0  -Morphine 2 mg PRN for chest pain  -Previous Echo (1/09/2023): LVEF 61 to 65%  -Previous CT angiogram coronary significant for mild coronary artery disease and mild stenosis of multiple vessels  -CXR unremarkable for acute cardiopulmonary findings  -Repeat echo pending  -Telemetry  -Repeat EKGs if chest pain worsens or changes in HR  -Consider barium swallow for possibility of esophageal spasm    GERD:  -Protonix 40 mg daily    HLD:  -Pravastatin 10 mg daily    CAD:  -Aspirin 81 mg daily  -Pravastatin 10 mg daily    Hypothyroidism:  -Continue thyroxine 25 mcg daily    DONNELL:  -Continue Celexa 20mg    DVT prophylaxis:  Medical DVT prophylaxis orders are present.     Jung Selby and I have discussed pain goals for this hospitalization after reviewing his current clinical condition, medical history and prior pain experiences.  The goal is to keep the pain level controlled.  To help achieve this, I plan to use PRN medication.    PDMP reviewed and consistent with patient reported medications.    Expected Length of Stay: 1 to 2 days    I discussed the patient's findings and my recommendations with patient and family.     Dr. Esequiel Acevedo is the attending on record at time of admission, He is aware of the patient's status and agrees with the above history and physical.      [facility]  Medicine Residency  [address] [place] [address]  Office: [phone]  This document has been electronically signed by Billy Trujillo DO on September 23, 2023 15:34 CDT

## 2023-09-23 NOTE — ED NOTES
Nursing report ED to floor  Jung Ben Da Silvagiana  52 y.o.  male    HPI:   Chief Complaint   Patient presents with    Chest Pain       Admitting doctor:   No admitting provider for patient encounter.    Consulting provider(s):  Consults       Date and Time Order Name Status Description    9/23/2023  1:20 PM Family Practice - Resident (on-call MD unless specified)               Admitting diagnosis:   The encounter diagnosis was Precordial pain.    Code status:   Current Code Status       Date Active Code Status Order ID Comments User Context       Not on file            Allergies:   Penicillins    Intake and Output    Intake/Output Summary (Last 24 hours) at 9/23/2023 1325  Last data filed at 9/23/2023 1126  Gross per 24 hour   Intake 1000 ml   Output --   Net 1000 ml       Weight:       09/23/23  0919   Weight: 97.5 kg (215 lb)       Most recent vitals:   Vitals:    09/23/23 1059 09/23/23 1125 09/23/23 1156 09/23/23 1248   BP: 108/69 116/73 118/77 132/77   BP Location: Right arm Right arm Right arm Right arm   Patient Position: Lying Lying Lying Lying   Pulse: 62 70 65 62   Resp: 18 18 18 18   Temp:       TempSrc:       SpO2: 98% 93% 97% 98%   Weight:       Height:         Oxygen Therapy: no    Active LDAs/IV Access:   Lines, Drains & Airways       Active LDAs       Name Placement date Placement time Site Days    Peripheral IV 09/23/23 1011 Left;Posterior Forearm 09/23/23  1011  Forearm  less than 1                    Labs (abnormal labs have a star):   Labs Reviewed   COMPREHENSIVE METABOLIC PANEL - Abnormal; Notable for the following components:       Result Value    Glucose 116 (*)     All other components within normal limits    Narrative:     GFR Normal >60  Chronic Kidney Disease <60  Kidney Failure <15     CBC WITH AUTO DIFFERENTIAL - Abnormal; Notable for the following components:    RDW 11.7 (*)     Neutrophil % 77.9 (*)     Lymphocyte % 16.0 (*)     Monocyte % 4.6 (*)     All other components within normal  limits   BLOOD GAS, ARTERIAL - Abnormal; Notable for the following components:    pO2, Arterial 75.1 (*)     All other components within normal limits   LIPASE - Normal   BNP (IN-HOUSE) - Normal    Narrative:     Among patients with dyspnea, NT-proBNP is highly sensitive for the detection of acute congestive heart failure. In addition NT-proBNP of <300 pg/ml effectively rules out acute congestive heart failure with 99% negative predictive value.     TROPONIN - Normal    Narrative:     High Sensitive Troponin T Reference Range:  <10.0 ng/L- Negative Female for AMI  <15.0 ng/L- Negative Male for AMI  >=10 - Abnormal Female indicating possible myocardial injury.  >=15 - Abnormal Male indicating possible myocardial injury.   Clinicians would have to utilize clinical acumen, EKG, Troponin, and serial changes to determine if it is an Acute Myocardial Infarction or myocardial injury due to an underlying chronic condition.        CK - Normal   MAGNESIUM - Normal   TSH - Normal   HIGH SENSITIVITIY TROPONIN T 2HR    Narrative:     High Sensitive Troponin T Reference Range:  <10.0 ng/L- Negative Female for AMI  <15.0 ng/L- Negative Male for AMI  >=10 - Abnormal Female indicating possible myocardial injury.  >=15 - Abnormal Male indicating possible myocardial injury.   Clinicians would have to utilize clinical acumen, EKG, Troponin, and serial changes to determine if it is an Acute Myocardial Infarction or myocardial injury due to an underlying chronic condition.        BLOOD GAS, ARTERIAL   CBC AND DIFFERENTIAL    Narrative:     The following orders were created for panel order CBC & Differential.  Procedure                               Abnormality         Status                     ---------                               -----------         ------                     CBC Auto Differential[[id number]]        Abnormal            Final result                 Please view results for these tests on the individual orders.   EXTRA  TUBES    Narrative:     The following orders were created for panel order Extra Tubes.  Procedure                               Abnormality         Status                     ---------                               -----------         ------                     Gold Top - SST[[id number]]                                   Final result               Light Blue Top[[id number]]                                   Final result                 Please view results for these tests on the individual orders.   GOLD TOP - SST   LIGHT BLUE TOP       Meds given in ED:   Medications   nitroglycerin (NITROSTAT) SL tablet 0.4 mg (0.4 mg Sublingual Given 9/23/23 1027)   sodium chloride 0.9 % bolus 1,000 mL (0 mL Intravenous Stopped 9/23/23 1126)           NIH Stroke Scale:       Isolation/Infection(s):  No active isolations   No active infections     COVID Testing  Collected no  Resulted na    Nursing report ED to floor:  Mental status: alert oriented x4  Ambulatory status: self  Precautions: no    ED nurse phone extentsion- [phone]

## 2023-09-23 NOTE — ED PROVIDER NOTES
Subjective   History of Present Illness  Started Celexa last night.     Chest Pain  Pain location:  L chest  Pain quality: pressure    Pain quality comment:  Cramping  Pain radiates to:  L shoulder and L arm  Pain severity:  Moderate  Duration:  4 days  Progression:  Waxing and waning  Chronicity:  New  Relieved by:  Nitroglycerin  Worsened by:  Certain positions  Associated symptoms: no abdominal pain, no cough, no diaphoresis, no dizziness, no dysphagia, no fatigue, no fever, no headache, no nausea, no shortness of breath, no vomiting and no weakness    Risk factors: coronary artery disease, high cholesterol and male sex    Risk factors: no diabetes mellitus, no hypertension and no smoking      Review of Systems   Constitutional:  Negative for appetite change, chills, diaphoresis, fatigue and fever.   HENT:  Negative for congestion, ear discharge, ear pain, nosebleeds, rhinorrhea, sinus pressure, sore throat and trouble swallowing.    Eyes:  Negative for discharge and redness.   Respiratory:  Negative for apnea, cough, chest tightness, shortness of breath and wheezing.    Cardiovascular:  Positive for chest pain.   Gastrointestinal:  Negative for abdominal pain, diarrhea, nausea and vomiting.   Endocrine: Negative for polyuria.   Genitourinary:  Negative for dysuria, frequency and urgency.   Musculoskeletal:  Negative for myalgias and neck pain.   Skin:  Negative for color change and rash.   Allergic/Immunologic: Negative for immunocompromised state.   Neurological:  Negative for dizziness, seizures, syncope, weakness, light-headedness and headaches.   Hematological:  Negative for adenopathy. Does not bruise/bleed easily.   Psychiatric/Behavioral:  Negative for behavioral problems and confusion.    All other systems reviewed and are negative.    Past Medical History:   Diagnosis Date   • Disease of thyroid gland    • GERD (gastroesophageal reflux disease)    • Hx of eye surgery    • Perforated ear drum         Allergies   Allergen Reactions   • Penicillins Rash       Past Surgical History:   Procedure Laterality Date   • COLONOSCOPY N/A 06/20/2022    Procedure: COLONOSCOPY 3:30;  Surgeon: Nestor Echols DO;  Location: [facility] ENDOSCOPY;  Service: Gastroenterology;  Laterality: N/A;   • CORNEAL TRANSPLANT Right    • ENDOSCOPY N/A 1/12/2023    Procedure: ESOPHAGOGASTRODUODENOSCOPY 10:00;  Surgeon: Nestor Echols DO;  Location: [facility] ENDOSCOPY;  Service: Gastroenterology;  Laterality: N/A;   • TYMPANOPLASTY Left        History reviewed. No pertinent family history.    Social History     Socioeconomic History   • Marital status:    Tobacco Use   • Smoking status: Never   • Smokeless tobacco: Never   Vaping Use   • Vaping Use: Never used   Substance and Sexual Activity   • Alcohol use: Not Currently   • Drug use: Never   • Sexual activity: Defer           Objective   Physical Exam  Vitals and nursing note reviewed.   Constitutional:       Appearance: He is well-developed.   HENT:      Head: Normocephalic and atraumatic.      Nose: Nose normal.   Eyes:      General: No scleral icterus.        Right eye: No discharge.         Left eye: No discharge.      Conjunctiva/sclera: Conjunctivae normal.      Pupils: Pupils are equal, round, and reactive to light.   Neck:      Trachea: No tracheal deviation.   Cardiovascular:      Rate and Rhythm: Normal rate and regular rhythm.      Heart sounds: Normal heart sounds. No murmur heard.  Pulmonary:      Effort: Pulmonary effort is normal. No respiratory distress.      Breath sounds: Normal breath sounds. No stridor. No wheezing or rales.   Abdominal:      General: Bowel sounds are normal. There is no distension.      Palpations: Abdomen is soft. There is no mass.      Tenderness: There is no abdominal tenderness. There is no guarding or rebound.   Musculoskeletal:      Cervical back: Normal range of motion and neck supple.   Skin:     General: Skin is warm and dry.       Findings: No erythema or rash.   Neurological:      Mental Status: He is alert and oriented to person, place, and time.      Coordination: Coordination normal.   Psychiatric:         Behavior: Behavior normal.         Thought Content: Thought content normal.       ECG 12 Lead      Date/Time: 9/23/2023 1:32 PM  Performed by: Kj Jefferson MD  Authorized by: Kj Jefferson MD   Interpreted by physician  Rhythm: sinus rhythm  BPM: 73  ST Segments: ST segments normal             ED Course  ED Course as of 09/23/23 1335   Sat Sep 23, 2023   1334 Patient was given nitroglycerin in the emergency department, which did help with his chest pain, but he developed significant hypotension and was given IV fluids and placed in Trendelenburg position and eventually had an improvement in his blood pressure. [CB]      ED Course User Index  [CB] Kj Jefferson MD           Labs Reviewed   COMPREHENSIVE METABOLIC PANEL - Abnormal; Notable for the following components:       Result Value    Glucose 116 (*)     All other components within normal limits    Narrative:     GFR Normal >60  Chronic Kidney Disease <60  Kidney Failure <15     CBC WITH AUTO DIFFERENTIAL - Abnormal; Notable for the following components:    RDW 11.7 (*)     Neutrophil % 77.9 (*)     Lymphocyte % 16.0 (*)     Monocyte % 4.6 (*)     All other components within normal limits   BLOOD GAS, ARTERIAL - Abnormal; Notable for the following components:    pO2, Arterial 75.1 (*)     All other components within normal limits   LIPASE - Normal   BNP (IN-HOUSE) - Normal    Narrative:     Among patients with dyspnea, NT-proBNP is highly sensitive for the detection of acute congestive heart failure. In addition NT-proBNP of <300 pg/ml effectively rules out acute congestive heart failure with 99% negative predictive value.     TROPONIN - Normal    Narrative:     High Sensitive Troponin T Reference Range:  <10.0 ng/L- Negative Female for AMI  <15.0 ng/L-  Negative Male for AMI  >=10 - Abnormal Female indicating possible myocardial injury.  >=15 - Abnormal Male indicating possible myocardial injury.   Clinicians would have to utilize clinical acumen, EKG, Troponin, and serial changes to determine if it is an Acute Myocardial Infarction or myocardial injury due to an underlying chronic condition.        CK - Normal   MAGNESIUM - Normal   TSH - Normal   HIGH SENSITIVITIY TROPONIN T 2HR    Narrative:     High Sensitive Troponin T Reference Range:  <10.0 ng/L- Negative Female for AMI  <15.0 ng/L- Negative Male for AMI  >=10 - Abnormal Female indicating possible myocardial injury.  >=15 - Abnormal Male indicating possible myocardial injury.   Clinicians would have to utilize clinical acumen, EKG, Troponin, and serial changes to determine if it is an Acute Myocardial Infarction or myocardial injury due to an underlying chronic condition.        BLOOD GAS, ARTERIAL   CBC AND DIFFERENTIAL    Narrative:     The following orders were created for panel order CBC & Differential.  Procedure                               Abnormality         Status                     ---------                               -----------         ------                     CBC Auto Differential[[id number]]        Abnormal            Final result                 Please view results for these tests on the individual orders.   EXTRA TUBES    Narrative:     The following orders were created for panel order Extra Tubes.  Procedure                               Abnormality         Status                     ---------                               -----------         ------                     Gold Top - SST[[id number]]                                   Final result               Light Blue Top[[id number]]                                   Final result                 Please view results for these tests on the individual orders.   GOLD TOP - SST   LIGHT BLUE TOP       No orders to display                                          Medical Decision Making  Problems Addressed:  Precordial pain: complicated acute illness or injury    Amount and/or Complexity of Data Reviewed  Labs: ordered.  ECG/medicine tests: ordered.    Risk  Prescription drug management.  Decision regarding hospitalization.        Final diagnoses:   Precordial pain       ED Disposition  ED Disposition       ED Disposition   Decision to Admit    Condition   --    Comment   Level of Care: Telemetry [5]   Diagnosis: Chest pain [015239]   Admitting Physician: NHAN CHAMBERLAIN [[id number]]   Attending Physician: NHAN CHAMBERLAIN [[id number]]                 No follow-up provider specified.       Medication List      No changes were made to your prescriptions during this visit.            Kj Jefferson MD  09/23/23 5604

## 2023-09-23 NOTE — ACP (ADVANCE CARE PLANNING)
The patient desires to be full code. He designates Bryn (wife) who can be contacted at [phone] to make medical decisions on his behalf if He is incapable of doing so. Patient declared said statement while fully alert and oriented on 09/23/23 at 13:57 CDT.      This document has been electronically signed by James Rosa MD on September 23, 2023 13:57 CDT

## 2023-09-24 ENCOUNTER — APPOINTMENT (OUTPATIENT)
Dept: CARDIOLOGY | Facility: HOSPITAL | Age: 53
End: 2023-09-24
Payer: COMMERCIAL

## 2023-09-24 LAB
ALBUMIN SERPL-MCNC: 4.4 G/DL (ref 3.5–5.2)
ALBUMIN/GLOB SERPL: 1.8 G/DL
ALP SERPL-CCNC: 46 U/L (ref 39–117)
ALT SERPL W P-5'-P-CCNC: 32 U/L (ref 1–41)
ANION GAP SERPL CALCULATED.3IONS-SCNC: 10 MMOL/L (ref 5–15)
AST SERPL-CCNC: 17 U/L (ref 1–40)
BASOPHILS # BLD AUTO: 0.04 10*3/MM3 (ref 0–0.2)
BASOPHILS NFR BLD AUTO: 0.4 % (ref 0–1.5)
BH CV ECHO MEAS - ACS: 2.16 CM
BH CV ECHO MEAS - AO MAX PG: 11 MMHG
BH CV ECHO MEAS - AO MEAN PG: 6 MMHG
BH CV ECHO MEAS - AO ROOT DIAM: 3.1 CM
BH CV ECHO MEAS - AO V2 MAX: 166 CM/SEC
BH CV ECHO MEAS - AO V2 VTI: 29.4 CM
BH CV ECHO MEAS - AVA(I,D): 2.48 CM2
BH CV ECHO MEAS - EDV(CUBED): 125.1 ML
BH CV ECHO MEAS - EDV(MOD-SP2): 116 ML
BH CV ECHO MEAS - EDV(MOD-SP4): 95.3 ML
BH CV ECHO MEAS - EF(MOD-BP): 65.4 %
BH CV ECHO MEAS - EF(MOD-SP2): 62.2 %
BH CV ECHO MEAS - EF(MOD-SP4): 67.6 %
BH CV ECHO MEAS - ESV(CUBED): 19.9 ML
BH CV ECHO MEAS - ESV(MOD-SP2): 43.8 ML
BH CV ECHO MEAS - ESV(MOD-SP4): 30.9 ML
BH CV ECHO MEAS - FS: 45.8 %
BH CV ECHO MEAS - IVS/LVPW: 1 CM
BH CV ECHO MEAS - IVSD: 1.02 CM
BH CV ECHO MEAS - LA DIMENSION: 3.7 CM
BH CV ECHO MEAS - LAT PEAK E' VEL: 12.3 CM/SEC
BH CV ECHO MEAS - LV DIASTOLIC VOL/BSA (35-75): 43.3 CM2
BH CV ECHO MEAS - LV MASS(C)D: 187.1 GRAMS
BH CV ECHO MEAS - LV MAX PG: 6.2 MMHG
BH CV ECHO MEAS - LV MEAN PG: 3 MMHG
BH CV ECHO MEAS - LV SYSTOLIC VOL/BSA (12-30): 14 CM2
BH CV ECHO MEAS - LV V1 MAX: 124 CM/SEC
BH CV ECHO MEAS - LV V1 VTI: 21.7 CM
BH CV ECHO MEAS - LVIDD: 5 CM
BH CV ECHO MEAS - LVIDS: 2.7 CM
BH CV ECHO MEAS - LVOT AREA: 3.4 CM2
BH CV ECHO MEAS - LVOT DIAM: 2.07 CM
BH CV ECHO MEAS - LVPWD: 1.02 CM
BH CV ECHO MEAS - MED PEAK E' VEL: 7.7 CM/SEC
BH CV ECHO MEAS - MR MAX PG: 42.1 MMHG
BH CV ECHO MEAS - MR MAX VEL: 324.4 CM/SEC
BH CV ECHO MEAS - MV A MAX VEL: 93 CM/SEC
BH CV ECHO MEAS - MV DEC SLOPE: 549.2 CM/SEC2
BH CV ECHO MEAS - MV DEC TIME: 0.18 SEC
BH CV ECHO MEAS - MV E MAX VEL: 83.6 CM/SEC
BH CV ECHO MEAS - MV E/A: 0.9
BH CV ECHO MEAS - MV MAX PG: 4.2 MMHG
BH CV ECHO MEAS - MV MEAN PG: 1.26 MMHG
BH CV ECHO MEAS - MV P1/2T: 41.2 MSEC
BH CV ECHO MEAS - MV V2 VTI: 15.4 CM
BH CV ECHO MEAS - MVA(P1/2T): 5.3 CM2
BH CV ECHO MEAS - MVA(VTI): 4.7 CM2
BH CV ECHO MEAS - PA V2 MAX: 145.2 CM/SEC
BH CV ECHO MEAS - RAP SYSTOLE: 3 MMHG
BH CV ECHO MEAS - RVDD: 3.1 CM
BH CV ECHO MEAS - RVSP: 33.1 MMHG
BH CV ECHO MEAS - SI(MOD-SP2): 32.8 ML/M2
BH CV ECHO MEAS - SI(MOD-SP4): 29.3 ML/M2
BH CV ECHO MEAS - SV(LVOT): 72.8 ML
BH CV ECHO MEAS - SV(MOD-SP2): 72.2 ML
BH CV ECHO MEAS - SV(MOD-SP4): 64.4 ML
BH CV ECHO MEAS - TAPSE (>1.6): 2.4 CM
BH CV ECHO MEAS - TR MAX PG: 30.1 MMHG
BH CV ECHO MEAS - TR MAX VEL: 274.2 CM/SEC
BH CV ECHO MEASUREMENTS AVERAGE E/E' RATIO: 8.36
BH CV ECHO SHUNT ASSESSMENT PERFORMED (HIDDEN SCRIPTING): 1
BILIRUB SERPL-MCNC: 0.8 MG/DL (ref 0–1.2)
BUN SERPL-MCNC: 14 MG/DL (ref 6–20)
BUN/CREAT SERPL: 15.6 (ref 7–25)
CALCIUM SPEC-SCNC: 9.2 MG/DL (ref 8.6–10.5)
CHLORIDE SERPL-SCNC: 100 MMOL/L (ref 98–107)
CO2 SERPL-SCNC: 26 MMOL/L (ref 22–29)
CREAT SERPL-MCNC: 0.9 MG/DL (ref 0.76–1.27)
DEPRECATED RDW RBC AUTO: 37.3 FL (ref 37–54)
EGFRCR SERPLBLD CKD-EPI 2021: 103.4 ML/MIN/1.73
EOSINOPHIL # BLD AUTO: 0.05 10*3/MM3 (ref 0–0.4)
EOSINOPHIL NFR BLD AUTO: 0.5 % (ref 0.3–6.2)
ERYTHROCYTE [DISTWIDTH] IN BLOOD BY AUTOMATED COUNT: 11.7 % (ref 12.3–15.4)
GLOBULIN UR ELPH-MCNC: 2.5 GM/DL
GLUCOSE SERPL-MCNC: 129 MG/DL (ref 65–99)
HCT VFR BLD AUTO: 42.1 % (ref 37.5–51)
HGB BLD-MCNC: 14.2 G/DL (ref 13–17.7)
IMM GRANULOCYTES # BLD AUTO: 0.07 10*3/MM3 (ref 0–0.05)
IMM GRANULOCYTES NFR BLD AUTO: 0.6 % (ref 0–0.5)
LEFT ATRIUM VOLUME INDEX: 18.8 ML/M2
LYMPHOCYTES # BLD AUTO: 1.25 10*3/MM3 (ref 0.7–3.1)
LYMPHOCYTES NFR BLD AUTO: 11.3 % (ref 19.6–45.3)
MCH RBC QN AUTO: 29.6 PG (ref 26.6–33)
MCHC RBC AUTO-ENTMCNC: 33.7 G/DL (ref 31.5–35.7)
MCV RBC AUTO: 87.7 FL (ref 79–97)
MONOCYTES # BLD AUTO: 0.45 10*3/MM3 (ref 0.1–0.9)
MONOCYTES NFR BLD AUTO: 4.1 % (ref 5–12)
NEUTROPHILS NFR BLD AUTO: 83.1 % (ref 42.7–76)
NEUTROPHILS NFR BLD AUTO: 9.23 10*3/MM3 (ref 1.7–7)
NRBC BLD AUTO-RTO: 0 /100 WBC (ref 0–0.2)
PLATELET # BLD AUTO: 148 10*3/MM3 (ref 140–450)
PMV BLD AUTO: 9.5 FL (ref 6–12)
POTASSIUM SERPL-SCNC: 4.3 MMOL/L (ref 3.5–5.2)
PROT SERPL-MCNC: 6.9 G/DL (ref 6–8.5)
QT INTERVAL: 374 MS
QTC INTERVAL: 412 MS
RBC # BLD AUTO: 4.8 10*6/MM3 (ref 4.14–5.8)
SODIUM SERPL-SCNC: 136 MMOL/L (ref 136–145)
TROPONIN T SERPL HS-MCNC: 7 NG/L
WBC NRBC COR # BLD: 11.09 10*3/MM3 (ref 3.4–10.8)

## 2023-09-24 PROCEDURE — 36415 COLL VENOUS BLD VENIPUNCTURE: CPT

## 2023-09-24 PROCEDURE — G0378 HOSPITAL OBSERVATION PER HR: HCPCS

## 2023-09-24 PROCEDURE — 84484 ASSAY OF TROPONIN QUANT: CPT

## 2023-09-24 PROCEDURE — 80053 COMPREHEN METABOLIC PANEL: CPT

## 2023-09-24 PROCEDURE — 96372 THER/PROPH/DIAG INJ SC/IM: CPT

## 2023-09-24 PROCEDURE — 85025 COMPLETE CBC W/AUTO DIFF WBC: CPT

## 2023-09-24 PROCEDURE — 25010000002 ENOXAPARIN PER 10 MG

## 2023-09-24 PROCEDURE — 93306 TTE W/DOPPLER COMPLETE: CPT

## 2023-09-24 PROCEDURE — 25010000002 MORPHINE PER 10 MG

## 2023-09-24 PROCEDURE — 96376 TX/PRO/DX INJ SAME DRUG ADON: CPT

## 2023-09-24 PROCEDURE — 99232 SBSQ HOSP IP/OBS MODERATE 35: CPT

## 2023-09-24 RX ORDER — TEMAZEPAM 15 MG/1
15 CAPSULE ORAL NIGHTLY PRN
Status: DISCONTINUED | OUTPATIENT
Start: 2023-09-24 | End: 2023-09-26 | Stop reason: HOSPADM

## 2023-09-24 RX ORDER — SUCRALFATE 1 G/1
1 TABLET ORAL
Status: DISCONTINUED | OUTPATIENT
Start: 2023-09-24 | End: 2023-09-26 | Stop reason: HOSPADM

## 2023-09-24 RX ORDER — DEXTROSE MONOHYDRATE 25 G/50ML
INJECTION, SOLUTION INTRAVENOUS
Status: DISPENSED
Start: 2023-09-24 | End: 2023-09-24

## 2023-09-24 RX ORDER — NIFEDIPINE 30 MG/1
30 TABLET, EXTENDED RELEASE ORAL
Status: DISCONTINUED | OUTPATIENT
Start: 2023-09-24 | End: 2023-09-26 | Stop reason: HOSPADM

## 2023-09-24 RX ORDER — MORPHINE SULFATE 2 MG/ML
2 INJECTION, SOLUTION INTRAMUSCULAR; INTRAVENOUS
Status: DISCONTINUED | OUTPATIENT
Start: 2023-09-24 | End: 2023-09-26 | Stop reason: HOSPADM

## 2023-09-24 RX ADMIN — SUCRALFATE 1 G: 1 TABLET ORAL at 11:25

## 2023-09-24 RX ADMIN — Medication 10 ML: at 20:24

## 2023-09-24 RX ADMIN — TEMAZEPAM 15 MG: 15 CAPSULE ORAL at 20:30

## 2023-09-24 RX ADMIN — NIFEDIPINE 30 MG: 30 TABLET, FILM COATED, EXTENDED RELEASE ORAL at 09:43

## 2023-09-24 RX ADMIN — MORPHINE SULFATE 2 MG: 2 INJECTION, SOLUTION INTRAMUSCULAR; INTRAVENOUS at 20:30

## 2023-09-24 RX ADMIN — Medication 10 ML: at 09:42

## 2023-09-24 RX ADMIN — PRAVASTATIN SODIUM 10 MG: 10 TABLET ORAL at 20:24

## 2023-09-24 RX ADMIN — ASPIRIN 81 MG: 81 TABLET, COATED ORAL at 09:39

## 2023-09-24 RX ADMIN — DOCUSATE SODIUM 50 MG AND SENNOSIDES 8.6 MG 2 TABLET: 8.6; 5 TABLET, FILM COATED ORAL at 09:39

## 2023-09-24 RX ADMIN — LEVOTHYROXINE SODIUM 25 MCG: 25 TABLET ORAL at 06:02

## 2023-09-24 RX ADMIN — SUCRALFATE 1 G: 1 TABLET ORAL at 17:03

## 2023-09-24 RX ADMIN — PANTOPRAZOLE SODIUM 40 MG: 40 TABLET, DELAYED RELEASE ORAL at 06:02

## 2023-09-24 RX ADMIN — MORPHINE SULFATE 2 MG: 2 INJECTION, SOLUTION INTRAMUSCULAR; INTRAVENOUS at 00:10

## 2023-09-24 RX ADMIN — MORPHINE SULFATE 2 MG: 2 INJECTION, SOLUTION INTRAMUSCULAR; INTRAVENOUS at 04:32

## 2023-09-24 RX ADMIN — ENOXAPARIN SODIUM 40 MG: 40 INJECTION SUBCUTANEOUS at 09:39

## 2023-09-24 RX ADMIN — MORPHINE SULFATE 2 MG: 2 INJECTION, SOLUTION INTRAMUSCULAR; INTRAVENOUS at 09:39

## 2023-09-24 NOTE — CONSULTS
CARDIOVASCULAR CONSULTATION   Christine Pillai M.D., [facility]                Reason for Consultation: Chest pain    Chief complaint chest pain    Subjective .     History of present illness:  Jung Selby is a 52 y.o. male with history of hyperlipidemia GERD who presents with chest discomfort symptoms he reports a knot in his chest as well as a burning throughout his chest radiating some towards his left arm.  Symptoms started yesterday morning and lasted for few hours.  Activity makes the symptoms better.  He notices when he walks or plays tennis he feels the tightness eases . this morning again around 4 AM he had the pain and was given morphine with improvement in symptoms.    Review of Systems   Cardiovascular:  Positive for chest pain.   Gastrointestinal:  Positive for heartburn.   All other systems reviewed and are negative.      History  Past Medical History:   Diagnosis Date    Disease of thyroid gland     GERD (gastroesophageal reflux disease)     Hx of eye surgery     Perforated ear drum    ,   Past Surgical History:   Procedure Laterality Date    COLONOSCOPY N/A 06/20/2022    Procedure: COLONOSCOPY 3:30;  Surgeon: Nestor Echols DO;  Location: [facility] ENDOSCOPY;  Service: Gastroenterology;  Laterality: N/A;    CORNEAL TRANSPLANT Right     ENDOSCOPY N/A 1/12/2023    Procedure: ESOPHAGOGASTRODUODENOSCOPY 10:00;  Surgeon: Nestor Echols DO;  Location: [facility] ENDOSCOPY;  Service: Gastroenterology;  Laterality: N/A;    TYMPANOPLASTY Left    , History reviewed. No pertinent family history.,   Social History     Tobacco Use    Smoking status: Never    Smokeless tobacco: Never   Vaping Use    Vaping Use: Never used   Substance Use Topics    Alcohol use: Not Currently    Drug use: Never   ,   Medications Prior to Admission   Medication Sig Dispense Refill Last Dose    aspirin 81 MG EC tablet Take 1 tablet by mouth Daily.   9/23/2023    cetirizine (zyrTEC) 10 MG tablet Take 1 tablet by mouth Daily.   " 9/22/2023    citalopram (CeleXA) 20 MG tablet Take 1 tablet by mouth Daily. 30 tablet 3 9/22/2023    levothyroxine (SYNTHROID, LEVOTHROID) 25 MCG tablet Take 1 tablet by mouth.   9/23/2023    omeprazole (priLOSEC) 20 MG capsule Take 1 capsule by mouth Daily.   9/22/2023    pravastatin (PRAVACHOL) 10 MG tablet Take 1 tablet by mouth Every Night. 90 tablet 1 Past Week    vitamin C (ASCORBIC ACID) 250 MG tablet Take 1 tablet by mouth Daily.   Past Week    and Allergies:  Penicillins    Objective   PHYSICAL EXAM:         Anticoagulants (From admission, onward)      Start     Dose/Rate Route Frequency Ordered Stop    09/23/23 1500  Enoxaparin Sodium (LOVENOX) syringe 40 mg         40 mg Subcutaneous Daily 09/23/23 1410              Vital Sign Min/Max for last 24 hours  Temp  Min: 96.8 °F (36 °C)  Max: 98 °F (36.7 °C)   BP  Min: 116/73  Max: 164/78   Pulse  Min: 54  Max: 127   Resp  Min: 18  Max: 18   SpO2  Min: 92 %  Max: 98 %   No data recorded   Weight  Min: 98.3 kg (216 lb 11.4 oz)  Max: 100 kg (220 lb 6.4 oz)                                                         Vitals:    09/24/23 0408 09/24/23 0500 09/24/23 0722 09/24/23 0917   BP: 164/78  138/80    BP Location: Right arm  Left arm    Patient Position: Lying  Lying    Pulse: 54  78    Resp: 18  18    Temp: 96.8 °F (36 °C)  98 °F (36.7 °C)    TempSrc: Infrared  Infrared    SpO2: 95%  92%    Weight:  98.3 kg (216 lb 12.8 oz)  98.3 kg (216 lb 11.4 oz)   Height:    182.9 cm (72.01\")       SpO2 Percentage    09/23/23 2350 09/24/23 0408 09/24/23 0722   SpO2: 94% 95% 92%     Body mass index is 29.38 kg/m².     Physical Exam  Constitutional:       Appearance: Normal appearance.   HENT:      Head: Normocephalic and atraumatic.      Mouth/Throat:      Mouth: Mucous membranes are dry.   Eyes:      Extraocular Movements: Extraocular movements intact.   Cardiovascular:      Rate and Rhythm: Normal rate and regular rhythm.      Heart sounds: Normal heart sounds.   Pulmonary: "      Breath sounds: Normal breath sounds.   Abdominal:      Palpations: Abdomen is soft.   Skin:     General: Skin is warm.   Neurological:      General: No focal deficit present.      Mental Status: He is alert and oriented to person, place, and time.   Psychiatric:         Mood and Affect: Mood normal.         Behavior: Behavior normal.           DATA REVIEWED:     EKG. I personally reviewed and interpreted the EKG.  normal EKG, normal sinus rhythm    ECG/EMG Results (all)       Procedure Component Value Units Date/Time    ECG 12 Lead [[id number]] Resulted: 09/23/23 1335     Updated: 09/23/23 1335    ECG 12 Lead Chest Pain [[id number]] Collected: 09/23/23 0924     Updated: 09/24/23 0759     QT Interval 374 ms      QTC Interval 412 ms     Narrative:      Test Reason : Chest Pain  Blood Pressure :   */*   mmHG  Vent. Rate :  73 BPM     Atrial Rate :  73 BPM     P-R Int : 142 ms          QRS Dur :  80 ms      QT Int : 374 ms       P-R-T Axes :  59  45  49 degrees     QTc Int : 412 ms    Normal sinus rhythm  Normal ECG  When compared with ECG of 06-JAN-2023 09:24,  No significant change was found    Referred By:            Confirmed By: RICHARD COOPER    Adult Transthoracic Echo Complete w/ Color, Spectral and Contrast if necessary per protocol [[id number]] Resulted: 09/24/23 1042     Updated: 09/24/23 1046     EF(MOD-bp) 65.4 %      LVIDd 5.0 cm      LVIDs 2.7 cm      IVSd 1.02 cm      LVPWd 1.02 cm      FS 45.8 %      IVS/LVPW 1.00 cm      ESV(cubed) 19.9 ml      LV Sys Vol (BSA corrected) 14.0 cm2      EDV(cubed) 125.1 ml      LV Souza Vol (BSA corrected) 43.3 cm2      LVOT area 3.4 cm2      LV mass(C)d 187.1 grams      LVOT diam 2.07 cm      EDV(MOD-sp2) 116.0 ml      EDV(MOD-sp4) 95.3 ml      ESV(MOD-sp2) 43.8 ml      ESV(MOD-sp4) 30.9 ml      SV(MOD-sp2) 72.2 ml      SV(MOD-sp4) 64.4 ml      SI(MOD-sp2) 32.8 ml/m2      SI(MOD-sp4) 29.3 ml/m2      EF(MOD-sp2) 62.2 %      EF(MOD-sp4) 67.6 %      MV E max chelsie  83.6 cm/sec      MV A max piter 93.0 cm/sec      MV dec time 0.18 sec      MV E/A 0.90     LA ESV Index (BP) 18.8 ml/m2      Med Peak E' Piter 7.7 cm/sec      Lat Peak E' Piter 12.3 cm/sec      Avg E/e' ratio 8.36     SV(LVOT) 72.8 ml      RVIDd 3.1 cm      TAPSE (>1.6) 2.40 cm      LA dimension (2D)  3.7 cm      LV V1 max 124.0 cm/sec      LV V1 max PG 6.2 mmHg      LV V1 mean PG 3.0 mmHg      LV V1 VTI 21.7 cm      Ao pk piter 166.0 cm/sec      Ao max PG 11.0 mmHg      Ao mean PG 6.0 mmHg      Ao V2 VTI 29.4 cm      ZOEY(I,D) 2.48 cm2      MV max PG 4.2 mmHg      MV mean PG 1.26 mmHg      MV V2 VTI 15.4 cm      MV P1/2t 41.2 msec      MVA(P1/2t) 5.3 cm2      MVA(VTI) 4.7 cm2      MV dec slope 549.2 cm/sec2      MR max piter 324.4 cm/sec      MR max PG 42.1 mmHg      TR max piter 274.2 cm/sec      TR max PG 30.1 mmHg      PA V2 max 145.2 cm/sec      Ao root diam 3.1 cm      ACS 2.16 cm      RVSP(TR) 33.1 mmHg      RAP systole 3.0 mmHg      BH CV ECHO SHUNT ASSESSMENT PERFORMED (HIDDEN SCRIPTING) 1    Narrative:        Left ventricular systolic function is normal. Calculated left   ventricular EF = 65.4%    Left ventricular diastolic function was normal.    Saline test results are negative.    Estimated right ventricular systolic pressure from tricuspid   regurgitation is normal (<35 mmHg).            ---------------------------------------------------  TTE/CHETAN:  Results for orders placed during the hospital encounter of 09/23/23    Adult Transthoracic Echo Complete w/ Color, Spectral and Contrast if necessary per protocol    Interpretation Summary    Left ventricular systolic function is normal. Calculated left ventricular EF = 65.4%    Left ventricular diastolic function was normal.    Saline test results are negative.    Estimated right ventricular systolic pressure from tricuspid regurgitation is normal (<35 mmHg).          -----------------------------------------------------  CXR/Imaging:     Imaging Results (Most Recent)        Procedure Component Value Units Date/Time    XR Chest PA & Lateral [[id number]] Collected: 09/23/23 1446     Updated: 09/23/23 1532    Narrative:      INDICATION:  Chest pain.    FINDINGS:  The heart size and pulmonary vascularity are normal.  The lungs are clear.  The  mediastinum, carlton and visualized osseous structures are unremarkable.      Impression:      No significant abnormality of the chest.                  -----------------------------------------------------  CT:   XR Chest PA & Lateral    Result Date: 9/23/2023  No significant abnormality of the chest.       ----------------------------------------------------  --------------------------------------------------------------------------------------------------    LABS:     The 10-year CVD risk score (Keyla et al., 2008) is: 11.4%    Values used to calculate the score:      Age: 52 years      Sex: Male      Diabetic: No      Tobacco smoker: No      Systolic Blood Pressure: 138 mmHg      Is BP treated: No      HDL Cholesterol: 45 mg/dL      Total Cholesterol: 208 mg/dL  Lab Results   Component Value Date    GLUCOSE 129 (H) 09/24/2023    BUN 14 09/24/2023    CREATININE 0.90 09/24/2023    BCR 15.6 09/24/2023    K 4.3 09/24/2023    CO2 26.0 09/24/2023    CALCIUM 9.2 09/24/2023    ALBUMIN 4.4 09/24/2023    AST 17 09/24/2023    ALT 32 09/24/2023       Lab Results   Component Value Date    GLUCOSE 129 (H) 09/24/2023    CALCIUM 9.2 09/24/2023     09/24/2023    K 4.3 09/24/2023    CO2 26.0 09/24/2023     09/24/2023    BUN 14 09/24/2023    CREATININE 0.90 09/24/2023    BCR 15.6 09/24/2023    ANIONGAP 10.0 09/24/2023       Lab Results   Component Value Date    WBC 11.09 (H) 09/24/2023    HGB 14.2 09/24/2023    HCT 42.1 09/24/2023    MCV 87.7 09/24/2023     09/24/2023       Lab Results   Component Value Date    CHOL 159 01/30/2019    CHLPL 208 (H) 04/20/2022    TRIG 220 (H) 04/20/2022    HDL 45 04/20/2022     04/20/2022       Lab  Results   Component Value Date    TSH 1.460 09/23/2023       Lab Results   Component Value Date    CKTOTAL 127 09/23/2023    CKMB <1.0000 06/07/2022    TROPONINI <0.050 10/24/2022    TROPONINT 7 09/24/2023       No results found for: HGBA1C  No results found for: DDIMER  Lab Results   Component Value Date    ALT 32 09/24/2023     No results found for: HGBA1C  Lab Results   Component Value Date    CREATININE 0.90 09/24/2023     No results found for: IRON, TIBC, FERRITIN  No results found for: INR, PROTIME          Assessment & Plan   Atypical chest pain  GERD    He overall has few risk factors primarily hypertriglyceridemia for obstructive coronary artery disease  Patient had recent in January CT coronaries showing mild less than 50% stenosis  His EKG is normal and unchanged from prior  Echocardiogram showing normal wall motions with no significant abnormalities and normal EF  His chest pain is atypical and we would agree with GI work-up to evaluate for noncardiac etiologies.  He overall has few risk factors primarily hypertriglyceridemia for obstructive coronary artery disease  Would recommend discontinuing baby aspirin which has not shown any mortality benefit in patients without known atherosclerotic vascular disease  Agree with trying a low-dose nifedipine to see if there is some improvement in symptoms.  Careful monitoring of blood pressure has he does not have documented hypertension

## 2023-09-24 NOTE — PROGRESS NOTES
FAMILY MEDICINE RESIDENCY SERVICE  DAILY PROGRESS NOTE    NAME: Jung Selby  : 1970  MRN: [id number]      LOS: 0 days     PROVIDER OF SERVICE: Billy Trujillo DO    Chief Complaint: Chest pain, atypical    Subjective:     Interval History:  History taken from: patient chart  Patient does report overnight severe increased chest pain which was alleviated with morphine but only for short period.  He denies any nausea, vomiting, palpitations, or SOA but does endorse significant diaphoresis during this time.  Vital signs stable and he remains afebrile.  Respiratory status stable on room air at 92% SPO2.    Review of Systems:   Review of Systems   Constitutional:  Positive for diaphoresis. Negative for chills and fever.   HENT: Negative.     Eyes: Negative.    Respiratory:  Positive for chest tightness. Negative for cough and shortness of breath.    Cardiovascular:  Positive for chest pain. Negative for palpitations and leg swelling.   Gastrointestinal:  Negative for abdominal pain, diarrhea, nausea and vomiting.   Endocrine: Negative.    Genitourinary: Negative.    Musculoskeletal: Negative.    Neurological:  Negative for dizziness, syncope, weakness, light-headedness and headaches.   Psychiatric/Behavioral: Negative.       Objective:     Vital Signs  Temp:  [96.8 °F (36 °C)-98 °F (36.7 °C)] 98 °F (36.7 °C)  Heart Rate:  [] 69  Resp:  [18] 18  BP: (118-164)/(64-81) 138/80   Body mass index is 29.38 kg/m².    Physical Exam  Physical Exam  Vitals reviewed.   Constitutional:       General: He is not in acute distress.     Appearance: Normal appearance. He is not ill-appearing or diaphoretic.   HENT:      Head: Normocephalic and atraumatic.      Right Ear: External ear normal.      Left Ear: External ear normal.      Nose: Nose normal. No congestion or rhinorrhea.      Mouth/Throat:      Mouth: Mucous membranes are moist.   Eyes:      Extraocular Movements: Extraocular movements intact.       Conjunctiva/sclera: Conjunctivae normal.      Pupils: Pupils are equal, round, and reactive to light.   Neck:      Vascular: No carotid bruit.   Cardiovascular:      Rate and Rhythm: Normal rate and regular rhythm.      Pulses: Normal pulses.      Heart sounds: Normal heart sounds. No murmur heard.    No friction rub. No gallop.   Pulmonary:      Effort: Pulmonary effort is normal.      Breath sounds: Normal breath sounds. No wheezing, rhonchi or rales.   Abdominal:      General: Abdomen is flat. Bowel sounds are normal. There is no distension.      Palpations: Abdomen is soft.      Tenderness: There is no abdominal tenderness.   Musculoskeletal:      Cervical back: Neck supple.   Lymphadenopathy:      Cervical: No cervical adenopathy.   Skin:     General: Skin is warm and dry.      Capillary Refill: Capillary refill takes less than 2 seconds.      Coloration: Skin is not jaundiced.      Findings: No rash.   Neurological:      General: No focal deficit present.      Mental Status: He is alert and oriented to person, place, and time.   Psychiatric:         Mood and Affect: Mood normal.         Behavior: Behavior normal.       Scheduled Meds   cetirizine, 10 mg, Oral, Daily  dextrose, , ,   enoxaparin, 40 mg, Subcutaneous, Daily  levothyroxine, 25 mcg, Oral, Q AM  NIFEdipine XL, 30 mg, Oral, Q24H  pantoprazole, 40 mg, Oral, Q AM  pravastatin, 10 mg, Oral, Nightly  senna-docusate sodium, 2 tablet, Oral, BID  sodium chloride, 10 mL, Intravenous, Q12H  sucralfate, 1 g, Oral, TID AC       PRN Meds     senna-docusate sodium **AND** polyethylene glycol **AND** bisacodyl **AND** bisacodyl    melatonin    Morphine    naloxone    ondansetron **OR** ondansetron    sodium chloride    sodium chloride      Diagnostic Data    Lab Results (last 24 hours)       Procedure Component Value Units Date/Time    High Sensitivity Troponin T [[id number]]  (Normal) Collected: 09/24/23 0659    Specimen: Blood Updated: 09/24/23 0751     HS  Troponin T 7 ng/L     Narrative:      High Sensitive Troponin T Reference Range:  <10.0 ng/L- Negative Female for AMI  <15.0 ng/L- Negative Male for AMI  >=10 - Abnormal Female indicating possible myocardial injury.  >=15 - Abnormal Male indicating possible myocardial injury.   Clinicians would have to utilize clinical acumen, EKG, Troponin, and serial changes to determine if it is an Acute Myocardial Infarction or myocardial injury due to an underlying chronic condition.         Comprehensive Metabolic Panel [[id number]]  (Abnormal) Collected: 09/24/23 0659    Specimen: Blood Updated: 09/24/23 0735     Glucose 129 mg/dL      BUN 14 mg/dL      Creatinine 0.90 mg/dL      Sodium 136 mmol/L      Potassium 4.3 mmol/L      Chloride 100 mmol/L      CO2 26.0 mmol/L      Calcium 9.2 mg/dL      Total Protein 6.9 g/dL      Albumin 4.4 g/dL      ALT (SGPT) 32 U/L      AST (SGOT) 17 U/L      Alkaline Phosphatase 46 U/L      Total Bilirubin 0.8 mg/dL      Globulin 2.5 gm/dL      A/G Ratio 1.8 g/dL      BUN/Creatinine Ratio 15.6     Anion Gap 10.0 mmol/L      eGFR 103.4 mL/min/1.73     Narrative:      GFR Normal >60  Chronic Kidney Disease <60  Kidney Failure <15      CBC & Differential [[id number]]  (Abnormal) Collected: 09/24/23 0659    Specimen: Blood Updated: 09/24/23 0721    Narrative:      The following orders were created for panel order CBC & Differential.  Procedure                               Abnormality         Status                     ---------                               -----------         ------                     CBC Auto Differential[[id number]]        Abnormal            Final result                 Please view results for these tests on the individual orders.    CBC Auto Differential [[id number]]  (Abnormal) Collected: 09/24/23 0659    Specimen: Blood Updated: 09/24/23 0721     WBC 11.09 10*3/mm3      RBC 4.80 10*6/mm3      Hemoglobin 14.2 g/dL      Hematocrit 42.1 %      MCV 87.7 fL      MCH 29.6 pg       MCHC 33.7 g/dL      RDW 11.7 %      RDW-SD 37.3 fl      MPV 9.5 fL      Platelets 148 10*3/mm3      Neutrophil % 83.1 %      Lymphocyte % 11.3 %      Monocyte % 4.1 %      Eosinophil % 0.5 %      Basophil % 0.4 %      Immature Grans % 0.6 %      Neutrophils, Absolute 9.23 10*3/mm3      Lymphocytes, Absolute 1.25 10*3/mm3      Monocytes, Absolute 0.45 10*3/mm3      Eosinophils, Absolute 0.05 10*3/mm3      Basophils, Absolute 0.04 10*3/mm3      Immature Grans, Absolute 0.07 10*3/mm3      nRBC 0.0 /100 WBC     High Sensitivity Troponin T 2Hr [[id number]] Collected: 09/23/23 1206    Specimen: Blood Updated: 09/23/23 1240     HS Troponin T <6 ng/L      Troponin T Delta --     Comment: Unable to calculate.       Narrative:      High Sensitive Troponin T Reference Range:  <10.0 ng/L- Negative Female for AMI  <15.0 ng/L- Negative Male for AMI  >=10 - Abnormal Female indicating possible myocardial injury.  >=15 - Abnormal Male indicating possible myocardial injury.   Clinicians would have to utilize clinical acumen, EKG, Troponin, and serial changes to determine if it is an Acute Myocardial Infarction or myocardial injury due to an underlying chronic condition.                 XR Chest PA & Lateral    Result Date: 9/23/2023  No significant abnormality of the chest.       I reviewed the patient's new clinical results.  I reviewed the patient's new imaging results and agree with the interpretation.  I reviewed the patient's other test results and agree with the interpretation    Assessment/Plan:     Active and Resolved Problems  Active Hospital Problems    Diagnosis  POA    **Chest pain, atypical [R07.89]  Yes    Hypothyroidism [E03.9]  Unknown    DONNELL (generalized anxiety disorder) [F41.1]  Unknown    Mild CAD [I25.10]  Yes    Hyperlipidemia LDL goal <70 [E78.5]  Yes    Gastroesophageal reflux disease [K21.9]  Yes      Resolved Hospital Problems   No resolved problems to display.       52 y.o. male with a CMH of hypothyroidism,  GERD, HLD, mild CAD admitted for chest pain rule out.  Vital signs stable, afebrile, respiratory status stable on room air SPO2 92%.  EKG normal, CK, trops, proBNP, and echo normal.  TSH and lipase WNL.  CXR unremarkable for cardiopulmonary findings.  No current evidence or concern for PE.  Repeat troponin normal after overnight chest pain event.  No acute findings or significant events on telemetry overnight during times of increased pain, he remained in normal sinus rhythm.  Suspicion low for cardiac ideology of chest pain.      Chest pain most likely secondary to esophageal spasm vs worsening esophageal inflammatory disease vs musculoskeletal.  Documented past history of eosinophilic esophagitis and GERD, will consider consult to GI and obtain barium swallow studies.  Started on nifedipine XL 30 mg daily for symptomatic control and lesser control of fluctuating BP and HR.    Chest Pain:  -Cardiology consulted, appreciate recs  -trops < 6, repeat 7  -proBNP < 36.0  -Morphine 2 mg PRN for chest pain  -Previous Echo (1/09/2023): LVEF 61 to 65%  -Repeat echo: LVEF 65.4%, unremarkable  -Previous CT angiogram coronary significant for mild coronary artery disease and mild stenosis of multiple vessels  -CXR unremarkable for acute cardiopulmonary findings  -Telemetry  -Barium swallow double contrast study to be performed tomorrow  -Consider consult to GI  -Procardia XL 30 mg daily     GERD:  -Protonix 40 mg daily     HLD:  -Pravastatin 10 mg daily     Hypothyroidism:  -Continue thyroxine 25 mcg daily     DVT prophylaxis:  Medical DVT prophylaxis orders are present.     Code status is   Code Status and Medical Interventions:   Ordered at: 09/23/23 1410     Level Of Support Discussed With:    Patient     Code Status (Patient has no pulse and is not breathing):    CPR (Attempt to Resuscitate)     Medical Interventions (Patient has pulse or is breathing):    Full Support       Plan for disposition: 1 to 2 days    Time: 30  minutes      [facility]  [address] [place] [address]  Office: [phone]  This document has been electronically signed by Billy Trujillo DO on September 24, 2023 12:09 CDT

## 2023-09-24 NOTE — PLAN OF CARE
Problem: Adult Inpatient Plan of Care  Goal: Plan of Care Review  Outcome: Ongoing, Progressing  Goal: Patient-Specific Goal (Individualized)  Outcome: Ongoing, Progressing  Goal: Absence of Hospital-Acquired Illness or Injury  Outcome: Ongoing, Progressing  Intervention: Identify and Manage Fall Risk  Recent Flowsheet Documentation  Taken 9/24/2023 0000 by Zabrina Stapleton RN  Safety Promotion/Fall Prevention: safety round/check completed  Taken 9/23/2023 2200 by Zabrina Stapleton RN  Safety Promotion/Fall Prevention: safety round/check completed  Taken 9/23/2023 2100 by Zabrina Stapleton RN  Safety Promotion/Fall Prevention: safety round/check completed  Taken 9/23/2023 2000 by Zabrina Stapleton RN  Safety Promotion/Fall Prevention: safety round/check completed  Taken 9/23/2023 1900 by Zabrina Stapleton RN  Safety Promotion/Fall Prevention: safety round/check completed  Goal: Optimal Comfort and Wellbeing  Outcome: Ongoing, Progressing  Intervention: Monitor Pain and Promote Comfort  Recent Flowsheet Documentation  Taken 9/24/2023 0010 by Zabrina Stapleton RN  Pain Management Interventions: see MAR  Taken 9/23/2023 2014 by Zabrina Stapleton RN  Pain Management Interventions: see MAR  Goal: Readiness for Transition of Care  Outcome: Ongoing, Progressing     Problem: Chest Pain  Goal: Resolution of Chest Pain Symptoms  Outcome: Ongoing, Progressing  Intervention: Manage Acute Chest Pain  Recent Flowsheet Documentation  Taken 9/23/2023 2200 by Zabrina Stapleton RN  Chest Pain Intervention:   morphine given   cardiac monitoring continued   Goal Outcome Evaluation:

## 2023-09-25 ENCOUNTER — APPOINTMENT (OUTPATIENT)
Dept: ULTRASOUND IMAGING | Facility: HOSPITAL | Age: 53
End: 2023-09-25
Payer: COMMERCIAL

## 2023-09-25 ENCOUNTER — APPOINTMENT (OUTPATIENT)
Dept: GENERAL RADIOLOGY | Facility: HOSPITAL | Age: 53
End: 2023-09-25
Payer: COMMERCIAL

## 2023-09-25 LAB
ALBUMIN SERPL-MCNC: 4.3 G/DL (ref 3.5–5.2)
ALBUMIN/GLOB SERPL: 1.6 G/DL
ALP SERPL-CCNC: 46 U/L (ref 39–117)
ALT SERPL W P-5'-P-CCNC: 28 U/L (ref 1–41)
ANION GAP SERPL CALCULATED.3IONS-SCNC: 9 MMOL/L (ref 5–15)
AST SERPL-CCNC: 16 U/L (ref 1–40)
BASOPHILS # BLD AUTO: 0.04 10*3/MM3 (ref 0–0.2)
BASOPHILS NFR BLD AUTO: 0.5 % (ref 0–1.5)
BILIRUB SERPL-MCNC: 0.5 MG/DL (ref 0–1.2)
BUN SERPL-MCNC: 14 MG/DL (ref 6–20)
BUN/CREAT SERPL: 14.9 (ref 7–25)
CALCIUM SPEC-SCNC: 9 MG/DL (ref 8.6–10.5)
CHLORIDE SERPL-SCNC: 100 MMOL/L (ref 98–107)
CO2 SERPL-SCNC: 28 MMOL/L (ref 22–29)
CREAT SERPL-MCNC: 0.94 MG/DL (ref 0.76–1.27)
DEPRECATED RDW RBC AUTO: 37.3 FL (ref 37–54)
EGFRCR SERPLBLD CKD-EPI 2021: 98.1 ML/MIN/1.73
EOSINOPHIL # BLD AUTO: 0.16 10*3/MM3 (ref 0–0.4)
EOSINOPHIL NFR BLD AUTO: 2 % (ref 0.3–6.2)
ERYTHROCYTE [DISTWIDTH] IN BLOOD BY AUTOMATED COUNT: 11.7 % (ref 12.3–15.4)
GLOBULIN UR ELPH-MCNC: 2.7 GM/DL
GLUCOSE SERPL-MCNC: 115 MG/DL (ref 65–99)
HCT VFR BLD AUTO: 41.9 % (ref 37.5–51)
HGB BLD-MCNC: 13.9 G/DL (ref 13–17.7)
IMM GRANULOCYTES # BLD AUTO: 0.03 10*3/MM3 (ref 0–0.05)
IMM GRANULOCYTES NFR BLD AUTO: 0.4 % (ref 0–0.5)
LYMPHOCYTES # BLD AUTO: 2.11 10*3/MM3 (ref 0.7–3.1)
LYMPHOCYTES NFR BLD AUTO: 26.3 % (ref 19.6–45.3)
MCH RBC QN AUTO: 29.1 PG (ref 26.6–33)
MCHC RBC AUTO-ENTMCNC: 33.2 G/DL (ref 31.5–35.7)
MCV RBC AUTO: 87.8 FL (ref 79–97)
MONOCYTES # BLD AUTO: 0.56 10*3/MM3 (ref 0.1–0.9)
MONOCYTES NFR BLD AUTO: 7 % (ref 5–12)
NEUTROPHILS NFR BLD AUTO: 5.11 10*3/MM3 (ref 1.7–7)
NEUTROPHILS NFR BLD AUTO: 63.8 % (ref 42.7–76)
NRBC BLD AUTO-RTO: 0 /100 WBC (ref 0–0.2)
PLATELET # BLD AUTO: 154 10*3/MM3 (ref 140–450)
PMV BLD AUTO: 10.1 FL (ref 6–12)
POTASSIUM SERPL-SCNC: 4.2 MMOL/L (ref 3.5–5.2)
PROT SERPL-MCNC: 7 G/DL (ref 6–8.5)
RBC # BLD AUTO: 4.77 10*6/MM3 (ref 4.14–5.8)
SODIUM SERPL-SCNC: 137 MMOL/L (ref 136–145)
WBC NRBC COR # BLD: 8.01 10*3/MM3 (ref 3.4–10.8)

## 2023-09-25 PROCEDURE — 85025 COMPLETE CBC W/AUTO DIFF WBC: CPT

## 2023-09-25 PROCEDURE — 74221 X-RAY XM ESOPHAGUS 2CNTRST: CPT

## 2023-09-25 PROCEDURE — 25010000002 ENOXAPARIN PER 10 MG

## 2023-09-25 PROCEDURE — 96372 THER/PROPH/DIAG INJ SC/IM: CPT

## 2023-09-25 PROCEDURE — G0378 HOSPITAL OBSERVATION PER HR: HCPCS

## 2023-09-25 PROCEDURE — 99232 SBSQ HOSP IP/OBS MODERATE 35: CPT

## 2023-09-25 PROCEDURE — 76604 US EXAM CHEST: CPT

## 2023-09-25 PROCEDURE — 80053 COMPREHEN METABOLIC PANEL: CPT

## 2023-09-25 RX ORDER — LIDOCAINE HYDROCHLORIDE 20 MG/ML
15 SOLUTION OROPHARYNGEAL ONCE
Status: COMPLETED | OUTPATIENT
Start: 2023-09-25 | End: 2023-09-25

## 2023-09-25 RX ORDER — TIZANIDINE 4 MG/1
4 TABLET ORAL EVERY 8 HOURS PRN
Status: DISCONTINUED | OUTPATIENT
Start: 2023-09-25 | End: 2023-09-26 | Stop reason: HOSPADM

## 2023-09-25 RX ORDER — TIZANIDINE 4 MG/1
4 TABLET ORAL EVERY 8 HOURS PRN
Status: DISCONTINUED | OUTPATIENT
Start: 2023-09-25 | End: 2023-09-25

## 2023-09-25 RX ORDER — ALUMINA, MAGNESIA, AND SIMETHICONE 2400; 2400; 240 MG/30ML; MG/30ML; MG/30ML
15 SUSPENSION ORAL ONCE
Status: COMPLETED | OUTPATIENT
Start: 2023-09-25 | End: 2023-09-25

## 2023-09-25 RX ORDER — LIDOCAINE 50 MG/G
1 PATCH TOPICAL
Status: DISCONTINUED | OUTPATIENT
Start: 2023-09-25 | End: 2023-09-26 | Stop reason: HOSPADM

## 2023-09-25 RX ORDER — ACETAMINOPHEN 325 MG/1
650 TABLET ORAL EVERY 6 HOURS PRN
Status: DISCONTINUED | OUTPATIENT
Start: 2023-09-25 | End: 2023-09-26 | Stop reason: HOSPADM

## 2023-09-25 RX ORDER — CETIRIZINE HYDROCHLORIDE 10 MG/1
10 TABLET ORAL EVERY MORNING
Status: DISCONTINUED | OUTPATIENT
Start: 2023-09-25 | End: 2023-09-26 | Stop reason: HOSPADM

## 2023-09-25 RX ADMIN — TEMAZEPAM 15 MG: 15 CAPSULE ORAL at 21:00

## 2023-09-25 RX ADMIN — SUCRALFATE 1 G: 1 TABLET ORAL at 17:05

## 2023-09-25 RX ADMIN — SUCRALFATE 1 G: 1 TABLET ORAL at 11:23

## 2023-09-25 RX ADMIN — CETIRIZINE HYDROCHLORIDE 10 MG: 10 TABLET, FILM COATED ORAL at 12:13

## 2023-09-25 RX ADMIN — ENOXAPARIN SODIUM 40 MG: 40 INJECTION SUBCUTANEOUS at 09:37

## 2023-09-25 RX ADMIN — LEVOTHYROXINE SODIUM 25 MCG: 25 TABLET ORAL at 05:10

## 2023-09-25 RX ADMIN — Medication 10 ML: at 08:02

## 2023-09-25 RX ADMIN — NIFEDIPINE 30 MG: 30 TABLET, FILM COATED, EXTENDED RELEASE ORAL at 09:37

## 2023-09-25 RX ADMIN — PANTOPRAZOLE SODIUM 40 MG: 40 TABLET, DELAYED RELEASE ORAL at 05:10

## 2023-09-25 RX ADMIN — LIDOCAINE HYDROCHLORIDE 15 ML: 20 SOLUTION ORAL; TOPICAL at 17:07

## 2023-09-25 RX ADMIN — ACETAMINOPHEN 650 MG: 325 TABLET, FILM COATED ORAL at 11:37

## 2023-09-25 RX ADMIN — PRAVASTATIN SODIUM 10 MG: 10 TABLET ORAL at 20:16

## 2023-09-25 RX ADMIN — ALUMINUM HYDROXIDE, MAGNESIUM HYDROXIDE, AND DIMETHICONE 15 ML: 400; 400; 40 SUSPENSION ORAL at 17:07

## 2023-09-25 RX ADMIN — Medication 10 ML: at 20:17

## 2023-09-25 RX ADMIN — LIDOCAINE 1 PATCH: 50 PATCH TOPICAL at 18:11

## 2023-09-25 RX ADMIN — SUCRALFATE 1 G: 1 TABLET ORAL at 09:37

## 2023-09-25 NOTE — PLAN OF CARE
Problem: Adult Inpatient Plan of Care  Goal: Plan of Care Review  Outcome: Ongoing, Progressing  Goal: Patient-Specific Goal (Individualized)  Outcome: Ongoing, Progressing  Goal: Absence of Hospital-Acquired Illness or Injury  Outcome: Ongoing, Progressing  Intervention: Identify and Manage Fall Risk  Recent Flowsheet Documentation  Taken 9/25/2023 0000 by Zabrina Stapleton, RN  Safety Promotion/Fall Prevention: safety round/check completed  Taken 9/24/2023 2200 by Zabrina Stapleton RN  Safety Promotion/Fall Prevention: safety round/check completed  Taken 9/24/2023 2100 by Zabrina Stapleton, RN  Safety Promotion/Fall Prevention: safety round/check completed  Taken 9/24/2023 2000 by Zabrina Stapleton RN  Safety Promotion/Fall Prevention: safety round/check completed  Taken 9/24/2023 1900 by Zabrina Stapleton, RN  Safety Promotion/Fall Prevention: safety round/check completed  Goal: Optimal Comfort and Wellbeing  Outcome: Ongoing, Progressing  Intervention: Monitor Pain and Promote Comfort  Recent Flowsheet Documentation  Taken 9/24/2023 2030 by Zabrina Stapleton, RN  Pain Management Interventions: see MAR  Goal: Readiness for Transition of Care  Outcome: Ongoing, Progressing     Problem: Chest Pain  Goal: Resolution of Chest Pain Symptoms  Outcome: Ongoing, Progressing   Goal Outcome Evaluation:

## 2023-09-25 NOTE — PLAN OF CARE
Goal Outcome Evaluation:  Plan of Care Reviewed With: patient           Outcome Evaluation: Pt reported a headache this afternoon, PRN tylenol administered. US of chest and Esophagram completed. Will continue to monitor.

## 2023-09-25 NOTE — MEDICAL STUDENT
FAMILY MEDICINE RESIDENCY SERVICE  DAILY PROGRESS NOTE    NAME: Jung Selby  : 1970  MRN: [id number]      LOS: 0 days     PROVIDER OF SERVICE: Adis Noe, Medical Student    Chief Complaint: Chest pain, atypical    Subjective:     Interval History:  History taken from: patient chart  Patient examined at bedside this AM. Reports continued chest pain that has been of less severity compared to level at admission. Reports two episodes of chest pain around 4am and midnight. Pain was relieved by getting up and walking around. Pain episodes lasted around a few minutes each time. Chest pain was not burning in quality as before. Also reports a throbbing like headache that began around 6pm. Patient did not take morphine but took restoril which allowed him to sleep through the headache. Headache may be due to new nifedipine. Patient also reports excessive sweating during sleep for the past two nights. Reports he sweated through his clothes and sheets and could not cool off. Afebrile overnight  Overall patient reports new medication nifedipine is improving the pain intensity. Understands cardiac workup so far is unremarkable and plans for barium swallow and further GI workup.     Review of Systems:   Review of Systems   Constitutional:  Positive for diaphoresis. Negative for chills and fever.   Eyes:  Negative for visual disturbance.   Respiratory:  Negative for cough, chest tightness and shortness of breath.    Cardiovascular:  Positive for chest pain. Negative for leg swelling.   Gastrointestinal:  Negative for abdominal pain, diarrhea, nausea and vomiting.   Genitourinary:  Negative for dysuria.   Musculoskeletal:  Negative for neck pain.   Neurological:  Positive for headaches. Negative for light-headedness.   Psychiatric/Behavioral:  Negative for sleep disturbance.      Objective:     Vital Signs  Temp:  [97.2 °F (36.2 °C)-97.4 °F (36.3 °C)] 97.4 °F (36.3 °C)  Heart Rate:  [56-98] 74  Resp:  [18]  18  BP: (114-138)/(63-74) 138/63   Body mass index is 29.64 kg/m².    Physical Exam  Physical Exam  Constitutional:       General: He is not in acute distress.     Appearance: Normal appearance.   HENT:      Head: Normocephalic.      Right Ear: External ear normal.      Left Ear: External ear normal.      Nose: Nose normal.      Mouth/Throat:      Pharynx: Oropharynx is clear.   Eyes:      Conjunctiva/sclera: Conjunctivae normal.   Cardiovascular:      Rate and Rhythm: Normal rate and regular rhythm.      Pulses: Normal pulses.      Heart sounds: Normal heart sounds.   Pulmonary:      Effort: Pulmonary effort is normal.      Breath sounds: Normal breath sounds.   Chest:      Chest wall: Tenderness present.          Comments: Mild tenderness to palpation  Skin:     General: Skin is warm and dry.   Neurological:      General: No focal deficit present.      Mental Status: He is alert.   Psychiatric:         Mood and Affect: Mood normal.         Behavior: Behavior normal.       Scheduled Meds   barium sulfate, 183 mL, Oral, Once in imaging  barium sulfate, 183 mL, Oral, Once in imaging  cetirizine, 10 mg, Oral, Daily  enoxaparin, 40 mg, Subcutaneous, Daily  levothyroxine, 25 mcg, Oral, Q AM  NIFEdipine XL, 30 mg, Oral, Q24H  pantoprazole, 40 mg, Oral, Q AM  pravastatin, 10 mg, Oral, Nightly  senna-docusate sodium, 2 tablet, Oral, BID  sodium chloride, 10 mL, Intravenous, Q12H  sucralfate, 1 g, Oral, TID AC       PRN Meds     acetaminophen    senna-docusate sodium **AND** polyethylene glycol **AND** bisacodyl **AND** bisacodyl    melatonin    Morphine    naloxone    ondansetron **OR** ondansetron    sodium chloride    sodium chloride    temazepam      Diagnostic Data    Results from last 7 days   Lab Units 09/25/23  0510   WBC 10*3/mm3 8.01   HEMOGLOBIN g/dL 13.9   HEMATOCRIT % 41.9   PLATELETS 10*3/mm3 154   GLUCOSE mg/dL 115*   CREATININE mg/dL 0.94   BUN mg/dL 14   SODIUM mmol/L 137   POTASSIUM mmol/L 4.2   AST (SGOT)  U/L 16   ALT (SGPT) U/L 28   ALK PHOS U/L 46   BILIRUBIN mg/dL 0.5   ANION GAP mmol/L 9.0          XR Chest PA & Lateral    Result Date: 9/23/2023  No significant abnormality of the chest.    FL Esophagram Complete Double-Contrast    Result Date: 9/25/2023  1. Normal esophagram and upper GI series.        I reviewed the patient's new clinical results.  I reviewed the patient's other test results and agree with the interpretation    Assessment/Plan:     Active Hospital Problems    Diagnosis  POA    **Chest pain, atypical [R07.89]  Yes    Hypothyroidism [E03.9]  Unknown    DONNELL (generalized anxiety disorder) [F41.1]  Unknown    Hyperlipidemia LDL goal <70 [E78.5]  Yes    Gastroesophageal reflux disease [K21.9]  Yes       DVT prophylaxis: Lovenox  Code status is   Code Status and Medical Interventions:   Ordered at: 09/23/23 1410     Level Of Support Discussed With:    Patient     Code Status (Patient has no pulse and is not breathing):    CPR (Attempt to Resuscitate)     Medical Interventions (Patient has pulse or is breathing):    Full Support     52 y.o. male with a CMH of hypothyroidism, GERD, HLD, mild CAD admitted for chest pain rule out.  Vital signs stable, afebrile, respiratory status stable on room air SPO2 92%.  EKG normal, CK, trops, proBNP, and echo normal.  TSH and lipase WNL.  CXR unremarkable for cardiopulmonary findings.  No current evidence or concern for PE.  Repeat troponin normal after overnight chest pain event.  No acute findings or significant events on telemetry overnight during times of increased pain, he remained in normal sinus rhythm. Suspicion low for cardiac ideology of chest pain. Consider esophageal spasm vs prinzmetal angina vs esophagitis/GERD.     Chest pain, atypical, possibly due to prinzmetal angina:  - Cardiology consult appreciated. Appreciate their recommendations  - Low HEART score given normal troponins, normal EKG, and clinical presentation  - CXR unremarkable for acute  process  - TTE unremarkable for cardiac source, EF 65%  - Previous CTA coronaries reports mild CAD and mild stenosis of LAD and circumflex  - Continue Nifedipine and consider increasing dose given symptomatic improvement, HR and BP normal overnight  - Advise patient to follow with cardiology outpatient once workup is complete during this admission  - Barium swallow unremarkable  - Consider further workup with GI or manometry studies  - Morphine PRN for pain  - Ultrasound of chest to evaluate tenderness and possible mass  - Continue to monitor for frequency and quality of chest pain episodes  - Consider muscle relaxer for symptomatic relief    GERD  - Continue Protonix     Hyperlipidemia:  - Continue home Pravachol    Hypothyroidism:  - Continue home Levothyroxine regimen    DVT Prophylaxis:  - Continue Lovenox      Adis Noe, Medical Student

## 2023-09-25 NOTE — PROGRESS NOTES
FAMILY MEDICINE RESIDENCY SERVICE  DAILY PROGRESS NOTE    NAME: Jung Selby  : 1970  MRN: [id number]      LOS: 0 days     PROVIDER OF SERVICE: Billy Trujillo DO    Chief Complaint: Chest pain, atypical    Subjective:     Interval History:  History taken from: patient chart  Pt reports multiple chest pain events overnight. He denies any N/V or SOA during the event. He does endorse new throbbing left sided temple headache. Vital signs stable and he remains afebrile. Respiratory Status stable on room air, SPO2 95%.    Review of Systems:   Review of Systems   Constitutional:  Positive for diaphoresis. Negative for chills and fever.   Respiratory:  Negative for cough, chest tightness and shortness of breath.    Cardiovascular:  Positive for chest pain. Negative for palpitations and leg swelling.   Gastrointestinal: Negative.    Genitourinary: Negative.    Neurological:  Positive for headaches. Negative for dizziness, tremors and syncope.     Objective:     Vital Signs  Temp:  [97.2 °F (36.2 °C)-97.4 °F (36.3 °C)] 97.4 °F (36.3 °C)  Heart Rate:  [56-98] 74  Resp:  [18] 18  BP: (114-138)/(63-74) 138/63   Body mass index is 29.64 kg/m².    Physical Exam  Physical Exam  Vitals reviewed.   Constitutional:       General: He is not in acute distress.     Appearance: Normal appearance.   HENT:      Head: Normocephalic and atraumatic.      Right Ear: External ear normal.      Left Ear: External ear normal.      Nose: Nose normal. No congestion or rhinorrhea.      Mouth/Throat:      Mouth: Mucous membranes are moist.   Eyes:      Extraocular Movements: Extraocular movements intact.      Conjunctiva/sclera: Conjunctivae normal.   Cardiovascular:      Rate and Rhythm: Normal rate and regular rhythm.      Pulses: Normal pulses.      Heart sounds: Normal heart sounds. No murmur heard.    No friction rub. No gallop.   Pulmonary:      Effort: Pulmonary effort is normal.      Breath sounds: Normal breath sounds. No  wheezing, rhonchi or rales.   Abdominal:      General: Abdomen is flat. Bowel sounds are normal. There is no distension.      Palpations: Abdomen is soft.      Tenderness: There is no abdominal tenderness.   Musculoskeletal:      Cervical back: Neck supple.   Lymphadenopathy:      Cervical: No cervical adenopathy.   Skin:     General: Skin is warm and dry.      Capillary Refill: Capillary refill takes less than 2 seconds.      Coloration: Skin is not jaundiced.      Findings: No rash.   Neurological:      General: No focal deficit present.      Mental Status: He is alert and oriented to person, place, and time.   Psychiatric:         Mood and Affect: Mood normal.         Behavior: Behavior normal.       Scheduled Meds   barium sulfate, 183 mL, Oral, Once in imaging  barium sulfate, 183 mL, Oral, Once in imaging  cetirizine, 10 mg, Oral, Daily  enoxaparin, 40 mg, Subcutaneous, Daily  levothyroxine, 25 mcg, Oral, Q AM  NIFEdipine XL, 30 mg, Oral, Q24H  pantoprazole, 40 mg, Oral, Q AM  pravastatin, 10 mg, Oral, Nightly  senna-docusate sodium, 2 tablet, Oral, BID  sodium chloride, 10 mL, Intravenous, Q12H  sucralfate, 1 g, Oral, TID AC       PRN Meds     senna-docusate sodium **AND** polyethylene glycol **AND** bisacodyl **AND** bisacodyl    melatonin    Morphine    naloxone    ondansetron **OR** ondansetron    sodium chloride    sodium chloride    temazepam      Diagnostic Data    Lab Results (last 24 hours)       Procedure Component Value Units Date/Time    Comprehensive Metabolic Panel [[id number]]  (Abnormal) Collected: 09/25/23 0510    Specimen: Blood Updated: 09/25/23 0636     Glucose 115 mg/dL      BUN 14 mg/dL      Creatinine 0.94 mg/dL      Sodium 137 mmol/L      Potassium 4.2 mmol/L      Comment: Slight hemolysis detected by analyzer. Results may be affected.        Chloride 100 mmol/L      CO2 28.0 mmol/L      Calcium 9.0 mg/dL      Total Protein 7.0 g/dL      Albumin 4.3 g/dL      ALT (SGPT) 28 U/L      AST  (SGOT) 16 U/L      Alkaline Phosphatase 46 U/L      Total Bilirubin 0.5 mg/dL      Globulin 2.7 gm/dL      A/G Ratio 1.6 g/dL      BUN/Creatinine Ratio 14.9     Anion Gap 9.0 mmol/L      eGFR 98.1 mL/min/1.73     Narrative:      GFR Normal >60  Chronic Kidney Disease <60  Kidney Failure <15      CBC & Differential [[id number]]  (Abnormal) Collected: 09/25/23 0510    Specimen: Blood Updated: 09/25/23 0615    Narrative:      The following orders were created for panel order CBC & Differential.  Procedure                               Abnormality         Status                     ---------                               -----------         ------                     CBC Auto Differential[[id number]]        Abnormal            Final result                 Please view results for these tests on the individual orders.    CBC Auto Differential [[id number]]  (Abnormal) Collected: 09/25/23 0510    Specimen: Blood Updated: 09/25/23 0615     WBC 8.01 10*3/mm3      RBC 4.77 10*6/mm3      Hemoglobin 13.9 g/dL      Hematocrit 41.9 %      MCV 87.8 fL      MCH 29.1 pg      MCHC 33.2 g/dL      RDW 11.7 %      RDW-SD 37.3 fl      MPV 10.1 fL      Platelets 154 10*3/mm3      Neutrophil % 63.8 %      Lymphocyte % 26.3 %      Monocyte % 7.0 %      Eosinophil % 2.0 %      Basophil % 0.5 %      Immature Grans % 0.4 %      Neutrophils, Absolute 5.11 10*3/mm3      Lymphocytes, Absolute 2.11 10*3/mm3      Monocytes, Absolute 0.56 10*3/mm3      Eosinophils, Absolute 0.16 10*3/mm3      Basophils, Absolute 0.04 10*3/mm3      Immature Grans, Absolute 0.03 10*3/mm3      nRBC 0.0 /100 WBC             XR Chest PA & Lateral    Result Date: 9/23/2023  No significant abnormality of the chest.    FL Esophagram Complete Double-Contrast    Result Date: 9/25/2023  1. Normal esophagram and upper GI series.        I reviewed the patient's new clinical results.  I reviewed the patient's new imaging results and agree with the interpretation.  I reviewed the  "patient's other test results and agree with the interpretation    Assessment/Plan:     Active and Resolved Problems  Active Hospital Problems    Diagnosis  POA    **Chest pain, atypical [R07.89]  Yes    Hypothyroidism [E03.9]  Unknown    DONNELL (generalized anxiety disorder) [F41.1]  Unknown    Hyperlipidemia LDL goal <70 [E78.5]  Yes    Gastroesophageal reflux disease [K21.9]  Yes      Resolved Hospital Problems   No resolved problems to display.       52 y.o. male with a CMH of hypothyroidism, GERD, HLD, mild CAD admitted for chest pain rule out.  Vital signs stable, afebrile, respiratory status stable on room air SPO2 95%.  He continues to have chest pain \"episodes\" at night but does endorse some mild improvement in symptoms since nifedipine was started. Telemetry confirms no significant events correlated during times of chest pain events. Barium swallow studies unremarkable. GI was consulted for further evaluation.      Chest Pain:  -GI consulted, appreciate recs  -Cardiology consulted, appreciate recs  -trops < 6, repeat 7  -proBNP < 36.0  -Morphine 2 mg PRN for chest pain  -Previous Echo (1/09/2023): LVEF 61 to 65%  -Repeat echo: LVEF 65.4%, unremarkable  -Previous CT angiogram coronary significant for mild coronary artery disease and mild stenosis of multiple vessels  -CXR unremarkable for acute cardiopulmonary findings  -Telemetry  -Barium swallow double contrast study unremarkable  -Procardia XL 30 mg daily     GERD:  -Protonix 40 mg daily     HLD:  -Pravastatin 10 mg daily     Hypothyroidism:  -Continue thyroxine 25 mcg daily    DVT prophylaxis:  Medical DVT prophylaxis orders are present.     Code status is   Code Status and Medical Interventions:   Ordered at: 09/23/23 1410     Level Of Support Discussed With:    Patient     Code Status (Patient has no pulse and is not breathing):    CPR (Attempt to Resuscitate)     Medical Interventions (Patient has pulse or is breathing):    Full Support       Plan for " disposition: 1 to 2 days    Time: 30 minutes      [facility]  [address] [place] [address]  Office: [phone]  This document has been electronically signed by Billy Trujillo DO on September 25, 2023 16:35 CDT           No

## 2023-09-25 NOTE — CONSULTS
Francoise Jackman DO,[facility]  Gastroenterology  Hepatology  Endoscopy  Board Certified in Internal Medicine and gastroenterology  [address], [address]  [place]. [address]  - [phone]   F - [phone]     GASTROENTEROLOGY CONSULT NOTE   FRANCOISE JACKMAN DO.         SUBJECTIVE:   9/25/2023    Name: Jung Selby  DOD: 1970    REASON FOR CONSULT: Chest pain syndrome    Chief Complaint:     Chief Complaint   Patient presents with    Chest Pain       Subjective: Chest pain for the past 1+ years.    Patient is 52 y.o. male, personal history of a chest pain syndrome that I have evaluated with EGD with biopsy that shows no evidence of any eosinophilic esophagitis, presents with chest pain syndrome.  This chest pain began on Thursday night.  He woke up on Friday morning.  He had taking Celexa that was given to him for suspected diagnosis of anxiety related symptoms.  He said that this is a totally different symptom than anything that he has had before.  This time he had severe burning across his chest that went to the left arm.  The patient said that for the past 1 week he is woke up with sweating drenches on his sheets.  He denies any shortness of breath.  The patient said that he does not have chest pain whenever he eats.  He says that his chest pain stops whenever he exerts himself.  He is not under any stress.  He says that there is continuous chest discomfort and feels like there is a spasm involving his chest wall with this..  He has had a CT of the chest with CT arteriography that shows the patient to have high output with ejection fraction of 80+ percent.  2 trans thoracic echocardiograms have shown no evidence of any increased ejection fraction.  There is no evidence of any wall abnormality or valve abnormality that is noted.  Recent barium swallow shows normal esophageal motility     ROS/HISTORY/ CURRENT MEDICATIONS/OBJECTIVE/VS/PE:   Review of Systems:   Review of Systems    Constitutional:  Positive for fatigue.   HENT: Negative.     Eyes: Negative.    Respiratory:  Positive for chest tightness.    Cardiovascular:  Positive for chest pain.   Gastrointestinal: Negative.    Endocrine: Negative.    Genitourinary: Negative.    Musculoskeletal: Negative.    Skin: Negative.    Allergic/Immunologic: Negative.    Neurological: Negative.    Hematological: Negative.    Psychiatric/Behavioral:  The patient is nervous/anxious.      History:     Past Medical History:   Diagnosis Date    Disease of thyroid gland     GERD (gastroesophageal reflux disease)     Hx of eye surgery     Perforated ear drum      Past Surgical History:   Procedure Laterality Date    COLONOSCOPY N/A 06/20/2022    Procedure: COLONOSCOPY 3:30;  Surgeon: Nestor Echols DO;  Location: [facility] ENDOSCOPY;  Service: Gastroenterology;  Laterality: N/A;    CORNEAL TRANSPLANT Right     ENDOSCOPY N/A 1/12/2023    Procedure: ESOPHAGOGASTRODUODENOSCOPY 10:00;  Surgeon: Nestor Echols DO;  Location: [facility] ENDOSCOPY;  Service: Gastroenterology;  Laterality: N/A;    TYMPANOPLASTY Left      History reviewed. No pertinent family history.  Social History     Tobacco Use    Smoking status: Never    Smokeless tobacco: Never   Vaping Use    Vaping Use: Never used   Substance Use Topics    Alcohol use: Not Currently    Drug use: Never     Medications Prior to Admission   Medication Sig Dispense Refill Last Dose    aspirin 81 MG EC tablet Take 1 tablet by mouth Daily.   9/23/2023    cetirizine (zyrTEC) 10 MG tablet Take 1 tablet by mouth Daily.   9/22/2023    citalopram (CeleXA) 20 MG tablet Take 1 tablet by mouth Daily. 30 tablet 3 9/22/2023    levothyroxine (SYNTHROID, LEVOTHROID) 25 MCG tablet Take 1 tablet by mouth.   9/23/2023    omeprazole (priLOSEC) 20 MG capsule Take 1 capsule by mouth Daily.   9/22/2023    pravastatin (PRAVACHOL) 10 MG tablet Take 1 tablet by mouth Every Night. 90 tablet 1 Past Week    vitamin C (ASCORBIC ACID)  250 MG tablet Take 1 tablet by mouth Daily.   Past Week     Allergies:  Penicillins    I have reviewed the patient's medical history, surgical history and family history in the available medical record system.     Current Medications:     Current Facility-Administered Medications   Medication Dose Route Frequency Provider Last Rate Last Admin    acetaminophen (TYLENOL) tablet 650 mg  650 mg Oral Q6H PRN Esequiel Acevedo MD   650 mg at 09/25/23 1137    barium sulfate (E-Z-PAQUE) 96 % oral suspension reconstituted suspension 183 mL  183 mL Oral Once in imaging James Rosa MD        barium sulfate (E-Z-PAQUE) 96 % oral suspension reconstituted suspension 183 mL  183 mL Oral Once in imaging Billy Trujillo DO        sennosides-docusate (PERICOLACE) 8.6-50 MG per tablet 2 tablet  2 tablet Oral BID Billy Trujillo DO   2 tablet at 09/24/23 0939    And    polyethylene glycol (MIRALAX) packet 17 g  17 g Oral Daily PRN Billy Trujillo DO        And    bisacodyl (DULCOLAX) EC tablet 5 mg  5 mg Oral Daily PRN Billy Trujillo DO        And    bisacodyl (DULCOLAX) suppository 10 mg  10 mg Rectal Daily PRN Billy Trujillo DO        cetirizine (zyrTEC) tablet 10 mg  10 mg Oral QAM Esequiel Acevedo MD   10 mg at 09/25/23 1213    Enoxaparin Sodium (LOVENOX) syringe 40 mg  40 mg Subcutaneous Daily Billy Trujillo DO   40 mg at 09/25/23 0937    levothyroxine (SYNTHROID, LEVOTHROID) tablet 25 mcg  25 mcg Oral Q AM Billy Trujillo DO   25 mcg at 09/25/23 0510    melatonin tablet 5 mg  5 mg Oral Nightly PRN Billy Trujillo DO   5 mg at 09/23/23 2014    morphine injection 2 mg  2 mg Intravenous Q2H PRN Billy Trujillo DO   2 mg at 09/24/23 2030    naloxone (NARCAN) injection 0.4 mg  0.4 mg Intravenous PRN Billy Trujillo DO        NIFEdipine XL (PROCARDIA XL) 24 hr tablet 30 mg  30 mg Oral Q24H Billy Trujillo DO   30 mg at 09/25/23 0937    ondansetron (ZOFRAN) tablet 4 mg  4 mg Oral Q6H PRN Billy Trujillo DO         Or    ondansetron (ZOFRAN) injection 4 mg  4 mg Intravenous Q6H PRN Trujillo, Billy, DO        pantoprazole (PROTONIX) EC tablet 40 mg  40 mg Oral Q AM Trujillo, Billy, DO   40 mg at 09/25/23 0510    pravastatin (PRAVACHOL) tablet 10 mg  10 mg Oral Nightly Trujillo, Billy, DO   10 mg at 09/24/23 2024    sodium chloride 0.9 % flush 10 mL  10 mL Intravenous Q12H Trujillo, Billy, DO   10 mL at 09/25/23 0802    sodium chloride 0.9 % flush 10 mL  10 mL Intravenous PRN Trujillo, Billy, DO        sodium chloride 0.9 % infusion 40 mL  40 mL Intravenous PRN Trujillo, Billy, DO        sucralfate (CARAFATE) tablet 1 g  1 g Oral TID AC Trujillo, Billy, DO   1 g at 09/25/23 1123    temazepam (RESTORIL) capsule 15 mg  15 mg Oral Nightly PRN Sandro Mazariegos MD   15 mg at 09/24/23 2030       Objective     Physical Exam:   Temp:  [97.2 °F (36.2 °C)-97.7 °F (36.5 °C)] 97.7 °F (36.5 °C)  Heart Rate:  [56-98] 85  Resp:  [18] 18  BP: (115-154)/(63-89) 140/87    Physical Exam:  General Appearance:    Alert, cooperative, in no acute distress   Head:    Normocephalic, without obvious abnormality, atraumatic   Eyes:            Lids and lashes normal, conjunctivae and sclerae normal, no icterus, no pallor, corneas clear, PERRLA   Ears:    Ears appear intact with no abnormalities noted   Throat:   No oral lesions, no thrush, oral mucosa moist   Neck:   No adenopathy, supple, trachea midline, no thyromegaly, no carotid bruit, no JVD   Back:     No kyphosis present, no scoliosis present, no skin lesions,    erythema or scars, no tenderness to percussion or  palpation,   range of motion normal   Lungs:     Clear to auscultation,respirations regular, even and  unlabored    Heart:    Regular rhythm and normal rate, normal S1 and S2, no  murmur, no gallop, no rub, no click   Breast Exam:    Deferred   Abdomen:     Normal bowel sounds, no masses, no organomegaly, soft  nontender, nondistended, no guarding, no rebound  tenderness   Genitalia:     Deferred   Extremities:   Moves all extremities well, no edema, no cyanosis, no redness   Pulses:   Pulses palpable and equal bilaterally   Skin:   No bleeding, bruising or rash   Lymph nodes:   No palpable adenopathy   Neurologic:   Cranial nerves 2 - 12 grossly intact, sensation intact, DTR  present and equal bilaterally      Results Review:     Lab Results   Component Value Date    WBC 8.01 09/25/2023    WBC 11.09 (H) 09/24/2023    WBC 8.46 09/23/2023    HGB 13.9 09/25/2023    HGB 14.2 09/24/2023    HGB 14.6 09/23/2023    HCT 41.9 09/25/2023    HCT 42.1 09/24/2023    HCT 42.9 09/23/2023     09/25/2023     09/24/2023     09/23/2023     Results from last 7 days   Lab Units 09/25/23  0510 09/24/23  0659 09/23/23  1011   ALK PHOS U/L 46 46 49   ALT (SGPT) U/L 28 32 39   AST (SGOT) U/L 16 17 20     Results from last 7 days   Lab Units 09/25/23  0510 09/24/23  0659 09/23/23  1011   BILIRUBIN mg/dL 0.5 0.8 0.4   ALK PHOS U/L 46 46 49     Lipase   Date Value Ref Range Status   09/23/2023 27 13 - 60 U/L Final     No results found for: INR  No results found for: URINECX    Radiology Review:  Imaging Results (Last 72 Hours)       Procedure Component Value Units Date/Time    US Chest [[id number]] Collected: 09/25/23 1148     Updated: 09/25/23 1152    Narrative:      Indication: Palpable abnormality of the left chest as per the patient    TECHNIQUE: High-resolution grayscale images of the chest were obtained.    FINDINGS: No abnormality of the superficial soft tissues is seen in the area of  concern.    Deeper structures can be better evaluated with CT if clinically indicated.    FL Esophagram Complete Double-Contrast [[id number]] Collected: 09/25/23 0940     Updated: 09/25/23 0944    Narrative:      XR ESOPHAGRAM UPPER GI W AIR    HISTORY: Chest Pain ISO PMH Eosinophilic Esophagitits    COMPARISON: None    FLUOROSCOPIC TIME: 54 seconds with a dose of 0.8 Gy/cm2    TECHNIQUE: D -contrast esophagram and  upper GI study was performed. Periodic  rapid sequence and spot images were acquired to document the progress of  contrast through the esophagus and upper GI tract.    FINDINGS: The esophagus is normal in contour, course and motility. No filling  defects are noted. The stomach appears normal in contour and motility. There is  no evidence of filling defect or mass. No hiatal hernia or gastroesophageal  reflux identified.  No gastric outlet obstruction.  The duodenum is normal in  appearance.  The duodenal-jejunal junction is in its normal location to the left  of midline.      Impression:      1. Normal esophagram and upper GI series.        XR Chest PA & Lateral [[id number]] Collected: 09/23/23 1446     Updated: 09/23/23 1532    Narrative:      INDICATION:  Chest pain.    FINDINGS:  The heart size and pulmonary vascularity are normal.  The lungs are clear.  The  mediastinum, carlton and visualized osseous structures are unremarkable.      Impression:      No significant abnormality of the chest.             I reviewed the patient's new clinical results.  I reviewed the patient's new imaging results and agree with the interpretation.     ASSESSMENT/PLAN:   ASSESSMENT:  1.  Chest pain syndrome.  Suspect anxiety related.  Doubt that this represents a significant gastro intestinal etiology  2.  No evidence to suggest eosinophilic esophagitis with normal biopsies.  No evidence of any lymphocytic esophagitis.  3.  Suspect anxiety related  4.  Possible Celexa induced chest discomfort.  Uncertain about this  PLAN:  1.  Would recommend BuSpar 7.5 mg twice daily  2.  No further GI evaluation is required     Nestor Echols DO  09/25/23  16:30 CDT

## 2023-09-26 VITALS
BODY MASS INDEX: 29.31 KG/M2 | SYSTOLIC BLOOD PRESSURE: 140 MMHG | HEART RATE: 69 BPM | HEIGHT: 72 IN | TEMPERATURE: 97.9 F | RESPIRATION RATE: 16 BRPM | OXYGEN SATURATION: 95 % | WEIGHT: 216.4 LBS | DIASTOLIC BLOOD PRESSURE: 80 MMHG

## 2023-09-26 LAB
ALBUMIN SERPL-MCNC: 4.4 G/DL (ref 3.5–5.2)
ALBUMIN/GLOB SERPL: 1.8 G/DL
ALP SERPL-CCNC: 44 U/L (ref 39–117)
ALT SERPL W P-5'-P-CCNC: 26 U/L (ref 1–41)
ANION GAP SERPL CALCULATED.3IONS-SCNC: 9 MMOL/L (ref 5–15)
AST SERPL-CCNC: 17 U/L (ref 1–40)
BASOPHILS # BLD AUTO: 0.03 10*3/MM3 (ref 0–0.2)
BASOPHILS NFR BLD AUTO: 0.4 % (ref 0–1.5)
BILIRUB SERPL-MCNC: 0.5 MG/DL (ref 0–1.2)
BUN SERPL-MCNC: 16 MG/DL (ref 6–20)
BUN/CREAT SERPL: 17 (ref 7–25)
CALCIUM SPEC-SCNC: 9.2 MG/DL (ref 8.6–10.5)
CHLORIDE SERPL-SCNC: 104 MMOL/L (ref 98–107)
CO2 SERPL-SCNC: 28 MMOL/L (ref 22–29)
CREAT SERPL-MCNC: 0.94 MG/DL (ref 0.76–1.27)
DEPRECATED RDW RBC AUTO: 37.5 FL (ref 37–54)
EGFRCR SERPLBLD CKD-EPI 2021: 98.1 ML/MIN/1.73
EOSINOPHIL # BLD AUTO: 0.12 10*3/MM3 (ref 0–0.4)
EOSINOPHIL NFR BLD AUTO: 1.6 % (ref 0.3–6.2)
ERYTHROCYTE [DISTWIDTH] IN BLOOD BY AUTOMATED COUNT: 11.8 % (ref 12.3–15.4)
GLOBULIN UR ELPH-MCNC: 2.4 GM/DL
GLUCOSE SERPL-MCNC: 117 MG/DL (ref 65–99)
HCT VFR BLD AUTO: 40.9 % (ref 37.5–51)
HGB BLD-MCNC: 13.8 G/DL (ref 13–17.7)
IMM GRANULOCYTES # BLD AUTO: 0.02 10*3/MM3 (ref 0–0.05)
IMM GRANULOCYTES NFR BLD AUTO: 0.3 % (ref 0–0.5)
LYMPHOCYTES # BLD AUTO: 1.87 10*3/MM3 (ref 0.7–3.1)
LYMPHOCYTES NFR BLD AUTO: 25.6 % (ref 19.6–45.3)
MCH RBC QN AUTO: 29.7 PG (ref 26.6–33)
MCHC RBC AUTO-ENTMCNC: 33.7 G/DL (ref 31.5–35.7)
MCV RBC AUTO: 88 FL (ref 79–97)
MONOCYTES # BLD AUTO: 0.71 10*3/MM3 (ref 0.1–0.9)
MONOCYTES NFR BLD AUTO: 9.7 % (ref 5–12)
NEUTROPHILS NFR BLD AUTO: 4.56 10*3/MM3 (ref 1.7–7)
NEUTROPHILS NFR BLD AUTO: 62.4 % (ref 42.7–76)
NRBC BLD AUTO-RTO: 0 /100 WBC (ref 0–0.2)
PLATELET # BLD AUTO: 147 10*3/MM3 (ref 140–450)
PMV BLD AUTO: 9.7 FL (ref 6–12)
POTASSIUM SERPL-SCNC: 3.8 MMOL/L (ref 3.5–5.2)
PROT SERPL-MCNC: 6.8 G/DL (ref 6–8.5)
RBC # BLD AUTO: 4.65 10*6/MM3 (ref 4.14–5.8)
SODIUM SERPL-SCNC: 141 MMOL/L (ref 136–145)
WBC NRBC COR # BLD: 7.31 10*3/MM3 (ref 3.4–10.8)

## 2023-09-26 PROCEDURE — G0378 HOSPITAL OBSERVATION PER HR: HCPCS

## 2023-09-26 PROCEDURE — 80053 COMPREHEN METABOLIC PANEL: CPT

## 2023-09-26 PROCEDURE — 99239 HOSP IP/OBS DSCHRG MGMT >30: CPT

## 2023-09-26 PROCEDURE — 96372 THER/PROPH/DIAG INJ SC/IM: CPT

## 2023-09-26 PROCEDURE — 85025 COMPLETE CBC W/AUTO DIFF WBC: CPT

## 2023-09-26 PROCEDURE — 25010000002 ENOXAPARIN PER 10 MG

## 2023-09-26 RX ORDER — LIDOCAINE 50 MG/G
1 PATCH TOPICAL
Qty: 30 PATCH | Refills: 0 | Status: SHIPPED | OUTPATIENT
Start: 2023-09-27

## 2023-09-26 RX ORDER — NIFEDIPINE 30 MG/1
30 TABLET, FILM COATED, EXTENDED RELEASE ORAL NIGHTLY
Qty: 30 TABLET | Refills: 0 | Status: SHIPPED | OUTPATIENT
Start: 2023-09-26

## 2023-09-26 RX ORDER — DULOXETIN HYDROCHLORIDE 60 MG/1
60 CAPSULE, DELAYED RELEASE ORAL DAILY
Qty: 30 CAPSULE | Refills: 0 | Status: SHIPPED | OUTPATIENT
Start: 2023-09-26 | End: 2023-09-26 | Stop reason: SDUPTHER

## 2023-09-26 RX ORDER — DULOXETIN HYDROCHLORIDE 30 MG/1
CAPSULE, DELAYED RELEASE ORAL
Qty: 21 CAPSULE | Refills: 0 | Status: SHIPPED | OUTPATIENT
Start: 2023-09-26 | End: 2023-10-10

## 2023-09-26 RX ORDER — BUSPIRONE HYDROCHLORIDE 7.5 MG/1
7.5 TABLET ORAL EVERY 12 HOURS SCHEDULED
Status: DISCONTINUED | OUTPATIENT
Start: 2023-09-26 | End: 2023-09-26 | Stop reason: HOSPADM

## 2023-09-26 RX ADMIN — NIFEDIPINE 30 MG: 30 TABLET, FILM COATED, EXTENDED RELEASE ORAL at 08:41

## 2023-09-26 RX ADMIN — SUCRALFATE 1 G: 1 TABLET ORAL at 08:41

## 2023-09-26 RX ADMIN — SUCRALFATE 1 G: 1 TABLET ORAL at 11:04

## 2023-09-26 RX ADMIN — CETIRIZINE HYDROCHLORIDE 10 MG: 10 TABLET, FILM COATED ORAL at 06:02

## 2023-09-26 RX ADMIN — LEVOTHYROXINE SODIUM 25 MCG: 25 TABLET ORAL at 06:02

## 2023-09-26 RX ADMIN — PANTOPRAZOLE SODIUM 40 MG: 40 TABLET, DELAYED RELEASE ORAL at 06:02

## 2023-09-26 RX ADMIN — DOCUSATE SODIUM 50 MG AND SENNOSIDES 8.6 MG 2 TABLET: 8.6; 5 TABLET, FILM COATED ORAL at 08:41

## 2023-09-26 RX ADMIN — BUSPIRONE HYDROCHLORIDE 7.5 MG: 7.5 TABLET ORAL at 08:41

## 2023-09-26 RX ADMIN — LIDOCAINE 1 PATCH: 50 PATCH TOPICAL at 09:37

## 2023-09-26 RX ADMIN — Medication 10 ML: at 08:41

## 2023-09-26 RX ADMIN — ENOXAPARIN SODIUM 40 MG: 40 INJECTION SUBCUTANEOUS at 08:41

## 2023-09-26 NOTE — MEDICAL STUDENT
FAMILY MEDICINE RESIDENCY SERVICE  DAILY PROGRESS NOTE    NAME: Jung Selby  : 1970  MRN: [id number]      LOS: 0 days     PROVIDER OF SERVICE: Adis Noe, Medical Student    Chief Complaint: Chest pain, atypical    Subjective:     Interval History:  History taken from: patient chart  Patient examined at bedside this AM. Reports no chest pain overnight. Did have some minor sensations that usually occur before the chest pains but did not develop into full pain episodes. Patient believes the Nifedipine is working great but does feel it wears off around the 6-8 hour yrn. Reports lidocaine patch and GI cocktail both helped with the chest discomfort. Did not take a PRN Zanaflex. Reports GI came by and suggested to further workup anxiety as source of pain and patient is open to this plan. No diaphoresis overnight and headache is no longer an issue. Patient reports one episode of dizziness that occurred while he was doing some bodyweight work outs in their room. Overall doing better and patient reports that we are on the right track as this is the best he has felt in a long time.    Review of Systems:   Review of Systems   Constitutional:  Negative for chills, diaphoresis and fever.   Eyes:  Negative for visual disturbance.   Respiratory:  Negative for cough, chest tightness and shortness of breath.    Cardiovascular:  Positive for chest pain. Negative for leg swelling.        Chest pain less frequent and less in severity   Gastrointestinal:  Negative for abdominal pain, diarrhea, nausea and vomiting.   Genitourinary:  Negative for dysuria.   Musculoskeletal:  Negative for neck pain.   Neurological:  Negative for light-headedness and headaches.   Psychiatric/Behavioral:  Negative for sleep disturbance.      Objective:     Vital Signs  Temp:  [97.3 °F (36.3 °C)-97.8 °F (36.6 °C)] 97.8 °F (36.6 °C)  Heart Rate:  [59-98] 63  Resp:  [18] 18  BP: (106-160)/(62-93) 106/62   Body mass index is 29.34  kg/m².    Physical Exam  Physical Exam  Constitutional:       General: He is not in acute distress.     Appearance: Normal appearance.   HENT:      Head: Normocephalic.      Right Ear: External ear normal.      Left Ear: External ear normal.      Nose: Nose normal.      Mouth/Throat:      Pharynx: Oropharynx is clear.   Eyes:      Conjunctiva/sclera: Conjunctivae normal.   Cardiovascular:      Rate and Rhythm: Normal rate and regular rhythm.      Pulses: Normal pulses.      Heart sounds: Normal heart sounds.   Pulmonary:      Effort: Pulmonary effort is normal.      Breath sounds: Normal breath sounds.   Chest:      Chest wall: Tenderness present.          Comments: Mild tenderness to palpation  Skin:     General: Skin is warm and dry.   Neurological:      General: No focal deficit present.      Mental Status: He is alert.   Psychiatric:         Mood and Affect: Mood normal.         Behavior: Behavior normal.       Scheduled Meds   barium sulfate, 183 mL, Oral, Once in imaging  barium sulfate, 183 mL, Oral, Once in imaging  cetirizine, 10 mg, Oral, QAM  enoxaparin, 40 mg, Subcutaneous, Daily  levothyroxine, 25 mcg, Oral, Q AM  lidocaine, 1 patch, Transdermal, Q24H  NIFEdipine XL, 30 mg, Oral, Q24H  pantoprazole, 40 mg, Oral, Q AM  pravastatin, 10 mg, Oral, Nightly  senna-docusate sodium, 2 tablet, Oral, BID  sodium chloride, 10 mL, Intravenous, Q12H  sucralfate, 1 g, Oral, TID AC       PRN Meds     acetaminophen    senna-docusate sodium **AND** polyethylene glycol **AND** bisacodyl **AND** bisacodyl    melatonin    Morphine    naloxone    ondansetron **OR** ondansetron    sodium chloride    sodium chloride    temazepam    tiZANidine      Diagnostic Data    Results from last 7 days   Lab Units 09/26/23  0519   WBC 10*3/mm3 7.31   HEMOGLOBIN g/dL 13.8   HEMATOCRIT % 40.9   PLATELETS 10*3/mm3 147   GLUCOSE mg/dL 117*   CREATININE mg/dL 0.94   BUN mg/dL 16   SODIUM mmol/L 141   POTASSIUM mmol/L 3.8   AST (SGOT) U/L 17    ALT (SGPT) U/L 26   ALK PHOS U/L 44   BILIRUBIN mg/dL 0.5   ANION GAP mmol/L 9.0         FL Esophagram Complete Double-Contrast    Result Date: 9/25/2023  1. Normal esophagram and upper GI series.        US Chest    Result Date: 9/25/2023  Indication: Palpable abnormality of the left chest as per the patient TECHNIQUE: High-resolution grayscale images of the chest were obtained. FINDINGS: No abnormality of the superficial soft tissues is seen in the area of concern. Deeper structures can be better evaluated with CT if clinically indicated.    I reviewed the patient's new clinical results.  I reviewed the patient's other test results and agree with the interpretation    Assessment/Plan:     Active Hospital Problems    Diagnosis  POA    **Chest pain, atypical [R07.89]  Yes    Hypothyroidism [E03.9]  Unknown    DONNELL (generalized anxiety disorder) [F41.1]  Unknown    Hyperlipidemia LDL goal <70 [E78.5]  Yes    Gastroesophageal reflux disease [K21.9]  Yes       DVT prophylaxis: Lovenox  Code status is   Code Status and Medical Interventions:   Ordered at: 09/23/23 1410     Level Of Support Discussed With:    Patient     Code Status (Patient has no pulse and is not breathing):    CPR (Attempt to Resuscitate)     Medical Interventions (Patient has pulse or is breathing):    Full Support     52 y.o. male with a CMH of hypothyroidism, GERD, HLD, mild CAD admitted for chest pain rule out. EKG normal, CK, trops, proBNP, and echo normal.  TSH and lipase WNL.  CXR unremarkable for cardiopulmonary findings.  No current evidence or concern for PE. Suspicion low for cardiac ideology of chest pain. GI workup negative for source of pain. Consider anxiety vs acute MSK source for chest pain.    Chest pain, atypical, possibly due to prinzmetal angina vs anxiety:  - Low HEART score given normal troponins, normal EKG, and clinical presentation  - CXR unremarkable for acute process  - TTE unremarkable for cardiac source, EF 65%  -  Previous CTA coronaries reports mild CAD and mild stenosis of LAD and circumflex  - Continue Nifedipine for prinzmetal angina vs esophageal spasm symptoms and consider increasing dose given symptomatic improvement, HR and BP normal overnight  - Cardiology consult appreciated. Appreciate their recommendations  - Barium swallow unremarkable for possible source of pain  - Ultrasound of chest unremarkable for source of pain  - Consider muscle relaxer for symptomatic relief. Patient declined yesterday as lidocaine helped  - GI cocktail helped   - Lidocaine patch helped  - GI consult appreciated. Appreciate their recommendations  - Consider Cymbalta for anxiety/neuro pain and follow up with primary care    GERD:  - Continue Protonix     Hyperlipidemia:  - Continue home Pravachol    Hypothyroidism:  - Continue home Levothyroxine regimen    DVT Prophylaxis:  - Continue Lovenox      Adis Noe, Medical Student

## (undated) DEVICE — CANN SMPL SOFTECH BIFLO ETCO2 A/M 7FT

## (undated) DEVICE — BITEBLOCK ENDO W/STRAP 60F A/ LF DISP

## (undated) DEVICE — SINGLE-USE BIOPSY FORCEPS: Brand: RADIAL JAW 4